# Patient Record
Sex: FEMALE | Race: WHITE | NOT HISPANIC OR LATINO
[De-identification: names, ages, dates, MRNs, and addresses within clinical notes are randomized per-mention and may not be internally consistent; named-entity substitution may affect disease eponyms.]

---

## 2018-02-11 ENCOUNTER — TRANSCRIPTION ENCOUNTER (OUTPATIENT)
Age: 58
End: 2018-02-11

## 2018-11-08 ENCOUNTER — APPOINTMENT (OUTPATIENT)
Dept: OTOLARYNGOLOGY | Facility: CLINIC | Age: 58
End: 2018-11-08
Payer: COMMERCIAL

## 2018-11-08 VITALS
HEART RATE: 87 BPM | SYSTOLIC BLOOD PRESSURE: 120 MMHG | BODY MASS INDEX: 33.84 KG/M2 | WEIGHT: 191 LBS | HEIGHT: 63 IN | DIASTOLIC BLOOD PRESSURE: 80 MMHG | OXYGEN SATURATION: 98 % | TEMPERATURE: 98.5 F

## 2018-11-08 DIAGNOSIS — Z83.3 FAMILY HISTORY OF DIABETES MELLITUS: ICD-10-CM

## 2018-11-08 DIAGNOSIS — Z82.49 FAMILY HISTORY OF ISCHEMIC HEART DISEASE AND OTHER DISEASES OF THE CIRCULATORY SYSTEM: ICD-10-CM

## 2018-11-08 DIAGNOSIS — Z80.6 FAMILY HISTORY OF LEUKEMIA: ICD-10-CM

## 2018-11-08 DIAGNOSIS — J30.9 ALLERGIC RHINITIS, UNSPECIFIED: ICD-10-CM

## 2018-11-08 DIAGNOSIS — Z80.0 FAMILY HISTORY OF MALIGNANT NEOPLASM OF DIGESTIVE ORGANS: ICD-10-CM

## 2018-11-08 PROCEDURE — 99215 OFFICE O/P EST HI 40 MIN: CPT

## 2018-12-10 ENCOUNTER — RESULT REVIEW (OUTPATIENT)
Age: 58
End: 2018-12-10

## 2019-01-09 ENCOUNTER — RESULT REVIEW (OUTPATIENT)
Age: 59
End: 2019-01-09

## 2020-04-23 ENCOUNTER — OUTPATIENT (OUTPATIENT)
Dept: OUTPATIENT SERVICES | Facility: HOSPITAL | Age: 60
LOS: 1 days | End: 2020-04-23

## 2020-06-20 ENCOUNTER — APPOINTMENT (OUTPATIENT)
Dept: DISASTER EMERGENCY | Facility: CLINIC | Age: 60
End: 2020-06-20

## 2020-06-21 LAB — SARS-COV-2 N GENE NPH QL NAA+PROBE: NOT DETECTED

## 2020-09-15 ENCOUNTER — APPOINTMENT (OUTPATIENT)
Dept: INTERNAL MEDICINE | Facility: CLINIC | Age: 60
End: 2020-09-15
Payer: COMMERCIAL

## 2020-09-15 ENCOUNTER — NON-APPOINTMENT (OUTPATIENT)
Age: 60
End: 2020-09-15

## 2020-09-15 VITALS
HEIGHT: 63 IN | DIASTOLIC BLOOD PRESSURE: 76 MMHG | OXYGEN SATURATION: 98 % | HEART RATE: 76 BPM | BODY MASS INDEX: 33.66 KG/M2 | TEMPERATURE: 97.6 F | WEIGHT: 190 LBS | SYSTOLIC BLOOD PRESSURE: 120 MMHG

## 2020-09-15 DIAGNOSIS — R43.0 ANOSMIA: ICD-10-CM

## 2020-09-15 DIAGNOSIS — Z23 ENCOUNTER FOR IMMUNIZATION: ICD-10-CM

## 2020-09-15 DIAGNOSIS — G47.33 OBSTRUCTIVE SLEEP APNEA (ADULT) (PEDIATRIC): ICD-10-CM

## 2020-09-15 DIAGNOSIS — Z01.818 ENCOUNTER FOR OTHER PREPROCEDURAL EXAMINATION: ICD-10-CM

## 2020-09-15 DIAGNOSIS — Z97.3 PRESENCE OF SPECTACLES AND CONTACT LENSES: ICD-10-CM

## 2020-09-15 DIAGNOSIS — G89.29 LOW BACK PAIN: ICD-10-CM

## 2020-09-15 DIAGNOSIS — J34.9 UNSPECIFIED DISORDER OF NOSE AND NASAL SINUSES: ICD-10-CM

## 2020-09-15 DIAGNOSIS — Z12.39 ENCOUNTER FOR OTHER SCREENING FOR MALIGNANT NEOPLASM OF BREAST: ICD-10-CM

## 2020-09-15 DIAGNOSIS — S92.911A UNSPECIFIED FRACTURE OF RIGHT TOE(S), INITIAL ENCOUNTER FOR CLOSED FRACTURE: ICD-10-CM

## 2020-09-15 DIAGNOSIS — Z78.0 ASYMPTOMATIC MENOPAUSAL STATE: ICD-10-CM

## 2020-09-15 DIAGNOSIS — Z13.820 ENCOUNTER FOR SCREENING FOR OSTEOPOROSIS: ICD-10-CM

## 2020-09-15 DIAGNOSIS — R79.89 OTHER SPECIFIED ABNORMAL FINDINGS OF BLOOD CHEMISTRY: ICD-10-CM

## 2020-09-15 DIAGNOSIS — I10 ESSENTIAL (PRIMARY) HYPERTENSION: ICD-10-CM

## 2020-09-15 DIAGNOSIS — Z87.2 PERSONAL HISTORY OF DISEASES OF THE SKIN AND SUBCUTANEOUS TISSUE: ICD-10-CM

## 2020-09-15 DIAGNOSIS — R06.2 WHEEZING: ICD-10-CM

## 2020-09-15 DIAGNOSIS — Z82.69 FAMILY HISTORY OF OTHER DISEASES OF THE MUSCULOSKELETAL SYSTEM AND CONNECTIVE TISSUE: ICD-10-CM

## 2020-09-15 DIAGNOSIS — Z78.9 OTHER SPECIFIED HEALTH STATUS: ICD-10-CM

## 2020-09-15 DIAGNOSIS — H00.019 HORDEOLUM EXTERNUM UNSPECIFIED EYE, UNSPECIFIED EYELID: ICD-10-CM

## 2020-09-15 DIAGNOSIS — H91.90 UNSPECIFIED HEARING LOSS, UNSPECIFIED EAR: ICD-10-CM

## 2020-09-15 DIAGNOSIS — G43.909 MIGRAINE, UNSPECIFIED, NOT INTRACTABLE, W/OUT STATUS MIGRAINOSUS: ICD-10-CM

## 2020-09-15 DIAGNOSIS — K21.9 GASTRO-ESOPHAGEAL REFLUX DISEASE W/OUT ESOPHAGITIS: ICD-10-CM

## 2020-09-15 DIAGNOSIS — J30.2 OTHER SEASONAL ALLERGIC RHINITIS: ICD-10-CM

## 2020-09-15 DIAGNOSIS — Z87.09 PERSONAL HISTORY OF OTHER DISEASES OF THE RESPIRATORY SYSTEM: ICD-10-CM

## 2020-09-15 DIAGNOSIS — R92.2 INCONCLUSIVE MAMMOGRAM: ICD-10-CM

## 2020-09-15 DIAGNOSIS — R01.1 CARDIAC MURMUR, UNSPECIFIED: ICD-10-CM

## 2020-09-15 DIAGNOSIS — Z86.32 PERSONAL HISTORY OF GESTATIONAL DIABETES: ICD-10-CM

## 2020-09-15 DIAGNOSIS — M54.5 LOW BACK PAIN: ICD-10-CM

## 2020-09-15 DIAGNOSIS — R07.89 OTHER CHEST PAIN: ICD-10-CM

## 2020-09-15 PROCEDURE — 93000 ELECTROCARDIOGRAM COMPLETE: CPT

## 2020-09-15 PROCEDURE — 99386 PREV VISIT NEW AGE 40-64: CPT | Mod: 25

## 2020-09-15 PROCEDURE — 90686 IIV4 VACC NO PRSV 0.5 ML IM: CPT

## 2020-09-15 PROCEDURE — 36415 COLL VENOUS BLD VENIPUNCTURE: CPT

## 2020-09-15 PROCEDURE — G0008: CPT

## 2020-09-16 PROBLEM — R07.89 ATYPICAL CHEST PAIN: Status: RESOLVED | Noted: 2020-09-15 | Resolved: 2020-09-16

## 2020-09-16 PROBLEM — G47.33 OBSTRUCTIVE SLEEP APNEA, ADULT: Status: RESOLVED | Noted: 2018-11-08 | Resolved: 2020-09-16

## 2020-09-16 PROBLEM — Z87.09 HISTORY OF BRONCHITIS: Status: RESOLVED | Noted: 2020-09-15 | Resolved: 2020-09-16

## 2020-09-16 PROBLEM — R43.0 ANOSMIA: Status: RESOLVED | Noted: 2018-11-08 | Resolved: 2020-09-16

## 2020-09-16 PROBLEM — Z78.9 ALCOHOL INGESTION: Status: ACTIVE | Noted: 2020-09-16

## 2020-09-16 PROBLEM — Z87.2 HISTORY OF URTICARIA: Status: RESOLVED | Noted: 2020-09-15 | Resolved: 2020-09-16

## 2020-09-16 PROBLEM — H00.019 STYE: Status: RESOLVED | Noted: 2020-09-15 | Resolved: 2020-09-16

## 2020-09-16 PROBLEM — Z78.9 SOCIAL ALCOHOL USE: Status: ACTIVE | Noted: 2018-11-08

## 2020-09-16 PROBLEM — Z78.9 DOES NOT USE ILLICIT DRUGS: Status: ACTIVE | Noted: 2018-11-08

## 2020-09-16 RX ORDER — MINOCYCLINE HYDROCHLORIDE 100 MG/1
100 CAPSULE ORAL
Qty: 10 | Refills: 0 | Status: COMPLETED | COMMUNITY
Start: 2020-07-29

## 2020-09-16 RX ORDER — NAPROXEN SODIUM 220 MG
220 TABLET ORAL
Refills: 0 | Status: DISCONTINUED | COMMUNITY
End: 2020-09-16

## 2020-09-16 RX ORDER — SODIUM SULFATE, POTASSIUM SULFATE, MAGNESIUM SULFATE 17.5; 3.13; 1.6 G/ML; G/ML; G/ML
17.5-3.13-1.6 SOLUTION, CONCENTRATE ORAL
Qty: 354 | Refills: 0 | Status: COMPLETED | COMMUNITY
Start: 2020-05-14

## 2020-09-16 RX ORDER — IBUPROFEN 200 MG/1
200 TABLET, COATED ORAL
Refills: 0 | Status: DISCONTINUED | COMMUNITY
End: 2020-09-16

## 2020-09-16 RX ORDER — FLUTICASONE PROPIONATE 50 UG/1
50 SPRAY, METERED NASAL DAILY
Qty: 1 | Refills: 11 | Status: DISCONTINUED | COMMUNITY
Start: 2018-11-08 | End: 2020-09-16

## 2020-09-16 NOTE — REVIEW OF SYSTEMS
[Negative] : Heme/Lymph [Hearing Loss] : hearing loss [Vision Problems] : vision problems [Dyspnea on Exertion] : dyspnea on exertion [Wheezing] : wheezing [Heartburn] : heartburn [Back Pain] : back pain [Headache] : headache

## 2020-09-17 NOTE — PHYSICAL EXAM
[No Acute Distress] : no acute distress [Well Nourished] : well nourished [Well Developed] : well developed [Well-Appearing] : well-appearing [Normal Sclera/Conjunctiva] : normal sclera/conjunctiva [PERRL] : pupils equal round and reactive to light [EOMI] : extraocular movements intact [Normal Outer Ear/Nose] : the outer ears and nose were normal in appearance [No Lymphadenopathy] : no lymphadenopathy [No JVD] : no jugular venous distention [Supple] : supple [No Respiratory Distress] : no respiratory distress  [Clear to Auscultation] : lungs were clear to auscultation bilaterally [No Accessory Muscle Use] : no accessory muscle use [Normal Rate] : normal rate  [Regular Rhythm] : with a regular rhythm [Normal S1, S2] : normal S1 and S2 [No Carotid Bruits] : no carotid bruits [Pedal Pulses Present] : the pedal pulses are present [No Extremity Clubbing/Cyanosis] : no extremity clubbing/cyanosis [No Edema] : there was no peripheral edema [Non Tender] : non-tender [Soft] : abdomen soft [No Masses] : no abdominal mass palpated [Non-distended] : non-distended [Normal Bowel Sounds] : normal bowel sounds [No HSM] : no HSM [Normal Anterior Cervical Nodes] : no anterior cervical lymphadenopathy [Normal Posterior Cervical Nodes] : no posterior cervical lymphadenopathy [No Spinal Tenderness] : no spinal tenderness [No CVA Tenderness] : no CVA  tenderness [Grossly Normal Strength/Tone] : grossly normal strength/tone [No Rash] : no rash [Coordination Grossly Intact] : coordination grossly intact [No Focal Deficits] : no focal deficits [Normal Affect] : the affect was normal [Normal Gait] : normal gait [Normal Voice/Communication] : normal voice/communication [Normal TMs] : both tympanic membranes were normal [Declined Breast Exam] : declined breast exam  [Declined Rectal Exam] : declined rectal exam [Normal Supraclavicular Nodes] : no supraclavicular lymphadenopathy [Normal Axillary Nodes] : no axillary lymphadenopathy [Deep Tendon Reflexes (DTR)] : deep tendon reflexes were 2+ and symmetric [Speech Grossly Normal] : speech grossly normal [Alert and Oriented x3] : oriented to person, place, and time [de-identified] : No sinus tenderness; wearing full facemask [de-identified] : No stridor or thyromegaly [de-identified] : R=16; good air entry with no wheezing [de-identified] : 2/6 systolic murmur [de-identified] : Normal radial pulses; no cords [de-identified] : As per GYN [de-identified] : Very tanned

## 2020-09-17 NOTE — HEALTH RISK ASSESSMENT
[Good] : ~his/her~ current health as good [Fair] :  ~his/her~ mood as fair [Intercurrent Urgi Care visits] : went to urgent care [Yes] : Yes [1 or 2 (0 pts)] : 1 or 2 (0 points) [Monthly or less (1 pt)] : Monthly or less (1 point) [Never (0 pts)] : Never (0 points) [No] : In the past 12 months have you used drugs other than those required for medical reasons? No [No falls in past year] : Patient reported no falls in the past year [Patient declined bone density test] : Patient declined bone density test [HIV Test offered] : HIV Test offered [Hepatitis C test offered] : Hepatitis C test offered [# of Members in Household ___] :  household currently consist of [unfilled] member(s) [Behavioral] : behavioral [With Family] : lives with family [Employed] : employed [Graduate School] : graduate school [] :  [# Of Children ___] : has [unfilled] children [Sexually Active] : sexually active [Feels Safe at Home] : Feels safe at home [Fully functional (bathing, dressing, toileting, transferring, walking, feeding)] : Fully functional (bathing, dressing, toileting, transferring, walking, feeding) [Reports changes in hearing] : Reports changes in hearing [Fully functional (using the telephone, shopping, preparing meals, housekeeping, doing laundry, using] : Fully functional and needs no help or supervision to perform IADLs (using the telephone, shopping, preparing meals, housekeeping, doing laundry, using transportation, managing medications and managing finances) [Reports changes in vision] : Reports changes in vision [Guns at Home] : guns at home [Smoke Detector] : smoke detector [Carbon Monoxide Detector] : carbon monoxide detector [Seat Belt] :  uses seat belt [Sunscreen] : uses sunscreen [With Patient/Caregiver] : With Patient/Caregiver [Name: ___] : Health Care Proxy's Name: [unfilled]  [Designated Healthcare Proxy] : Designated healthcare proxy [Relationship: ___] : Relationship: [unfilled] [Patient declined PAP Smear] : Patient declined PAP Smear [Patient declined mammogram] : Patient declined mammogram [0] : 2) Feeling down, depressed, or hopeless: Not at all (0) [FreeTextEntry1] : HTN, SOB, left eye stye/broken blood vessels [] : No [de-identified] : cardiology, dentist, ophtho, derm, ENT [Audit-CScore] : 1 [de-identified] : babita [de-identified] : low carb/ low salt [[id number]] : 0 [CXQ2Xtslv] : 0 [Change in mental status noted] : No change in mental status noted [Reports changes in dental health] : Reports no changes in dental health [Travel to Developing Areas] : does not  travel to developing areas [TB Exposure] : is not being exposed to tuberculosis [Caregiver Concerns] : does not have caregiver concerns [MammogramDate] : 2018 [PapSmearDate] : 2018 [ColonoscopyDate] : 06/20 [FreeTextEntry2] :  [AdvancecareDate] : 09/20

## 2020-09-17 NOTE — ASSESSMENT
[FreeTextEntry1] : See health maintenance assessment and plan outlined below.  In addition:\par Full labs and urine sent today\par To do bone density 2020 = RX given\par Can see ortho- spine for LBP = referrals given\par Had colonoscopy 2020 - will obtain report\par Must see GYN 2020\par To do mammograms 2020 = RX given\par See scanned EKG report = reviewed with patient\par To see cardiology in f/u \par Needs echo and needs 24 hour home BP monitoring\par To do CXR 2020 = RX given\par To set up full PFT's\par To do thyroid US\par Endocrine f/u = referrals given\par To see ENT for hearing test and sinus issues = referrals given\par Allergy consult with Dr. Mcgarry\par Referred to sleep medicine for FLORIDALMA f/u\par Can see Neuro for migraines if needed\par To f/u with derm, ophtho, dentist\par RTC for annual physical\par RTC after PFT's and as needed\par To call for any medical issues\par

## 2020-09-21 LAB
25(OH)D3 SERPL-MCNC: 29.5 NG/ML
ABO + RH PNL BLD: NORMAL
ALBUMIN SERPL ELPH-MCNC: 4.3 G/DL
ALP BLD-CCNC: 103 U/L
ALT SERPL-CCNC: 25 U/L
ANION GAP SERPL CALC-SCNC: 11 MMOL/L
APPEARANCE: CLEAR
AST SERPL-CCNC: 17 U/L
BACTERIA: ABNORMAL
BASOPHILS # BLD AUTO: 0.08 K/UL
BASOPHILS NFR BLD AUTO: 1.1 %
BILIRUB SERPL-MCNC: 0.3 MG/DL
BILIRUBIN URINE: NEGATIVE
BLOOD URINE: NEGATIVE
BUN SERPL-MCNC: 21 MG/DL
CALCIUM SERPL-MCNC: 9.3 MG/DL
CHLORIDE SERPL-SCNC: 105 MMOL/L
CHOLEST SERPL-MCNC: 180 MG/DL
CHOLEST/HDLC SERPL: 3.6 RATIO
CO2 SERPL-SCNC: 24 MMOL/L
COLOR: NORMAL
CREAT SERPL-MCNC: 0.7 MG/DL
EOSINOPHIL # BLD AUTO: 0.1 K/UL
EOSINOPHIL NFR BLD AUTO: 1.4 %
ESTIMATED AVERAGE GLUCOSE: 120 MG/DL
FOLATE SERPL-MCNC: 11 NG/ML
GLUCOSE QUALITATIVE U: NEGATIVE
GLUCOSE SERPL-MCNC: 114 MG/DL
HBA1C MFR BLD HPLC: 5.8 %
HCT VFR BLD CALC: 42.6 %
HCV AB SER QL: NONREACTIVE
HCV S/CO RATIO: 0.11 S/CO
HDLC SERPL-MCNC: 50 MG/DL
HGB BLD-MCNC: 13.8 G/DL
HIV1+2 AB SPEC QL IA.RAPID: NONREACTIVE
HYALINE CASTS: 2 /LPF
IMM GRANULOCYTES NFR BLD AUTO: 0.1 %
IRON SERPL-MCNC: 57 UG/DL
KETONES URINE: NEGATIVE
LDLC SERPL CALC-MCNC: 117 MG/DL
LEUKOCYTE ESTERASE URINE: NEGATIVE
LYMPHOCYTES # BLD AUTO: 1.81 K/UL
LYMPHOCYTES NFR BLD AUTO: 24.6 %
MAN DIFF?: NORMAL
MCHC RBC-ENTMCNC: 29.7 PG
MCHC RBC-ENTMCNC: 32.4 GM/DL
MCV RBC AUTO: 91.8 FL
MICROSCOPIC-UA: NORMAL
MONOCYTES # BLD AUTO: 0.54 K/UL
MONOCYTES NFR BLD AUTO: 7.3 %
NEUTROPHILS # BLD AUTO: 4.81 K/UL
NEUTROPHILS NFR BLD AUTO: 65.5 %
NITRITE URINE: NEGATIVE
PH URINE: 6
PLATELET # BLD AUTO: 293 K/UL
POTASSIUM SERPL-SCNC: 5.2 MMOL/L
PROT SERPL-MCNC: 6.7 G/DL
PROTEIN URINE: NORMAL
RBC # BLD: 4.64 M/UL
RBC # FLD: 13 %
RED BLOOD CELLS URINE: 1 /HPF
SODIUM SERPL-SCNC: 140 MMOL/L
SPECIFIC GRAVITY URINE: 1.03
SQUAMOUS EPITHELIAL CELLS: 8 /HPF
TRIGL SERPL-MCNC: 66 MG/DL
TSH SERPL-ACNC: 1.25 UIU/ML
UROBILINOGEN URINE: NORMAL
VIT B12 SERPL-MCNC: 1071 PG/ML
WBC # FLD AUTO: 7.35 K/UL
WHITE BLOOD CELLS URINE: 6 /HPF

## 2020-11-02 ENCOUNTER — NON-APPOINTMENT (OUTPATIENT)
Age: 60
End: 2020-11-02

## 2020-11-10 ENCOUNTER — NON-APPOINTMENT (OUTPATIENT)
Age: 60
End: 2020-11-10

## 2020-11-24 ENCOUNTER — APPOINTMENT (OUTPATIENT)
Dept: ENDOCRINOLOGY | Facility: CLINIC | Age: 60
End: 2020-11-24
Payer: COMMERCIAL

## 2020-12-22 ENCOUNTER — APPOINTMENT (OUTPATIENT)
Dept: ENDOCRINOLOGY | Facility: CLINIC | Age: 60
End: 2020-12-22
Payer: COMMERCIAL

## 2020-12-22 VITALS
SYSTOLIC BLOOD PRESSURE: 143 MMHG | HEART RATE: 81 BPM | RESPIRATION RATE: 16 BRPM | BODY MASS INDEX: 34.2 KG/M2 | OXYGEN SATURATION: 96 % | TEMPERATURE: 98.6 F | DIASTOLIC BLOOD PRESSURE: 77 MMHG | WEIGHT: 193.03 LBS | HEIGHT: 63 IN

## 2020-12-22 PROCEDURE — 99244 OFF/OP CNSLTJ NEW/EST MOD 40: CPT

## 2020-12-22 PROCEDURE — 99072 ADDL SUPL MATRL&STAF TM PHE: CPT

## 2020-12-22 NOTE — CONSULT LETTER
[Dear  ___] : Dear  [unfilled], [Consult Letter:] : I had the pleasure of evaluating your patient, [unfilled]. [( Thank you for referring [unfilled] for consultation for _____ )] : Thank you for referring [unfilled] for consultation for [unfilled] [Please see my note below.] : Please see my note below. [Consult Closing:] : Thank you very much for allowing me to participate in the care of this patient.  If you have any questions, please do not hesitate to contact me. [Sincerely,] : Sincerely, [FreeTextEntry2] : Lakshmi Johns MD\par [address]\[place] [address]\par  [FreeTextEntry3] : Woody Baltazar DO\par  of Medicine\par [facility]\par

## 2020-12-22 NOTE — PHYSICAL EXAM
[Alert] : alert [No Acute Distress] : no acute distress [EOMI] : extra ocular movement intact [No LAD] : no lymphadenopathy [No Respiratory Distress] : no respiratory distress [Clear to Auscultation] : lungs were clear to auscultation bilaterally [Normal S1, S2] : normal S1 and S2 [Regular Rhythm] : with a regular rhythm [No Edema] : no peripheral edema [Normal Bowel Sounds] : normal bowel sounds [Not Tender] : non-tender [Not Distended] : not distended [Soft] : abdomen soft [Normal Anterior Cervical Nodes] : no anterior cervical lymphadenopathy [No Clubbing, Cyanosis] : no clubbing  or cyanosis of the fingernails [No Rash] : no rash [Normal Reflexes] : deep tendon reflexes were 2+ and symmetric [Normal Affect] : the affect was normal [Normal Mood] : the mood was normal [de-identified] : RAVEN

## 2020-12-22 NOTE — ASSESSMENT
[FreeTextEntry1] : 60 y.o. female with h/o multinodular goiter, prediabetes and obesity.\par \par 1. MNG/multiple thyroid nodules- Discussed pathophysiology. Patient is clinically and biochemically euthyroid. There is no need for thyroid hormone replacement at this time. Given the large size of the left dominant thyroid nodule, would consider surgery. Patient is not interested in surgery at this time. For now, will check CT scan of the neck to evaluate for tracheal compression. Also will refer for ultrasound guided FNA of b/l dominant nodules since we will be monitoring for now. \par \par 2. Prediabetes and Obesity- Hba1c is 5.8%. Discussed increased risk of developing Type 2 DM. Encouraged a carbohydrate consistent diet with portion control and exercise. Dietary literature was provided to the patient. \par \par Follow up pending results

## 2020-12-22 NOTE — HISTORY OF PRESENT ILLNESS
[FreeTextEntry1] : 60 y.o. female with h/o thyroid nodules diagnosed in 2004 presents for management. Was following with Dr. Jackman (ENT) in 2018 and reports had FNA in ?2015 which was benign. Most recent thyroid ultrasound on October 30, 2020 showed enlarged gland with left lobe larger than right. There are multiple b/l nodules with largest on the right measuring 2.0 which is hypoechoic nodule with cystic component. The largest on the left is 5.1 cm isoechoic solid nodule with small cystic components. Nodules appear relatively stable compared to 2018. No dysphagia and no dysphonia. No neck pain. No head or neck RT exposure. No family history of thyroid disease. In regards to symptoms, patient reports fatigue, body pains, and weight gain. \par \par \par Does report SOB with exertion. Seeing cardiology at [place]. Had normal stress echo. \par \par Also h/o prediabetes. No polyuria and no polydipsia. Now wearing glasses; otherwise vision is stable. Reports gradual weight gain over the years. Reports weight gain of 12 pounds during pandemic. No exercise.

## 2020-12-22 NOTE — REVIEW OF SYSTEMS
[Fatigue] : fatigue [Recent Weight Gain (___ Lbs)] : recent weight gain: [unfilled] lbs [SOB on Exertion] : shortness of breath on exertion [Back Pain] : back pain [Anxiety] : anxiety [Stress] : stress [Hot Flashes] : hot flashes [Negative] : Gastrointestinal [Polyuria] : no polyuria [Dry Skin] : no dry skin [Hair Loss] : no hair loss [Polydipsia] : no polydipsia [Swelling] : no swelling [FreeTextEntry9] : leg pain

## 2020-12-22 NOTE — REASON FOR VISIT
[Consultation] : a consultation visit [Thyroid nodule/ MNG] : thyroid nodule/ MNG [FreeTextEntry2] : Lakshmi Johns MD

## 2021-01-19 ENCOUNTER — APPOINTMENT (OUTPATIENT)
Dept: INTERNAL MEDICINE | Facility: CLINIC | Age: 61
End: 2021-01-19

## 2021-01-19 ENCOUNTER — TRANSCRIPTION ENCOUNTER (OUTPATIENT)
Age: 61
End: 2021-01-19

## 2021-01-19 ENCOUNTER — APPOINTMENT (OUTPATIENT)
Dept: INTERNAL MEDICINE | Facility: CLINIC | Age: 61
End: 2021-01-19
Payer: COMMERCIAL

## 2021-01-19 ENCOUNTER — RESULT REVIEW (OUTPATIENT)
Age: 61
End: 2021-01-19

## 2021-01-19 PROCEDURE — 99442: CPT

## 2021-01-20 ENCOUNTER — APPOINTMENT (OUTPATIENT)
Dept: ULTRASOUND IMAGING | Facility: IMAGING CENTER | Age: 61
End: 2021-01-20

## 2021-01-20 ENCOUNTER — OUTPATIENT (OUTPATIENT)
Dept: OUTPATIENT SERVICES | Facility: HOSPITAL | Age: 61
LOS: 1 days | End: 2021-01-20
Payer: COMMERCIAL

## 2021-01-20 ENCOUNTER — APPOINTMENT (OUTPATIENT)
Dept: CT IMAGING | Facility: IMAGING CENTER | Age: 61
End: 2021-01-20

## 2021-01-20 ENCOUNTER — RESULT REVIEW (OUTPATIENT)
Age: 61
End: 2021-01-20

## 2021-01-20 DIAGNOSIS — E04.2 NONTOXIC MULTINODULAR GOITER: ICD-10-CM

## 2021-01-20 PROCEDURE — 88172 CYTP DX EVAL FNA 1ST EA SITE: CPT

## 2021-01-20 PROCEDURE — 10005 FNA BX W/US GDN 1ST LES: CPT

## 2021-01-20 PROCEDURE — 10006 FNA BX W/US GDN EA ADDL: CPT

## 2021-01-20 PROCEDURE — 70490 CT SOFT TISSUE NECK W/O DYE: CPT | Mod: 26

## 2021-01-20 PROCEDURE — 88173 CYTOPATH EVAL FNA REPORT: CPT

## 2021-01-20 PROCEDURE — 70490 CT SOFT TISSUE NECK W/O DYE: CPT

## 2021-01-20 PROCEDURE — 88173 CYTOPATH EVAL FNA REPORT: CPT | Mod: 26

## 2021-01-21 LAB — NON-GYNECOLOGICAL CYTOLOGY STUDY: SIGNIFICANT CHANGE UP

## 2021-01-26 ENCOUNTER — TRANSCRIPTION ENCOUNTER (OUTPATIENT)
Age: 61
End: 2021-01-26

## 2021-02-24 ENCOUNTER — APPOINTMENT (OUTPATIENT)
Dept: INTERNAL MEDICINE | Facility: CLINIC | Age: 61
End: 2021-02-24
Payer: COMMERCIAL

## 2021-02-24 VITALS
OXYGEN SATURATION: 97 % | TEMPERATURE: 97.2 F | HEART RATE: 91 BPM | WEIGHT: 192 LBS | DIASTOLIC BLOOD PRESSURE: 80 MMHG | HEIGHT: 63 IN | SYSTOLIC BLOOD PRESSURE: 144 MMHG | BODY MASS INDEX: 34.02 KG/M2

## 2021-02-24 DIAGNOSIS — U07.1 COVID-19: ICD-10-CM

## 2021-02-24 DIAGNOSIS — R22.2 LOCALIZED SWELLING, MASS AND LUMP, TRUNK: ICD-10-CM

## 2021-02-24 PROCEDURE — 36415 COLL VENOUS BLD VENIPUNCTURE: CPT

## 2021-02-24 PROCEDURE — 99072 ADDL SUPL MATRL&STAF TM PHE: CPT

## 2021-02-24 PROCEDURE — 99214 OFFICE O/P EST MOD 30 MIN: CPT | Mod: 25

## 2021-02-25 PROBLEM — U07.1 COVID-19: Status: ACTIVE | Noted: 2021-01-20

## 2021-02-25 PROBLEM — R22.2 PALPABLE MASS OF LOWER BACK: Status: ACTIVE | Noted: 2021-02-25

## 2021-02-25 NOTE — REVIEW OF SYSTEMS
[Vision Problems] : vision problems [Hearing Loss] : hearing loss [Cough] : cough [Heartburn] : heartburn [Back Pain] : back pain [Headache] : headache [Negative] : Heme/Lymph

## 2021-02-25 NOTE — HEALTH RISK ASSESSMENT
[] : No [Yes] : Yes [No] : In the past 12 months have you used drugs other than those required for medical reasons? No [No falls in past year] : Patient reported no falls in the past year [0] : 2) Feeling down, depressed, or hopeless: Not at all (0) [de-identified] : ENT, endocrine [de-identified] : None [de-identified] : Regular [[id number]] : 0
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
## 2021-02-25 NOTE — PHYSICAL EXAM
[No Acute Distress] : no acute distress [Well Nourished] : well nourished [Well Developed] : well developed [Well-Appearing] : well-appearing [Normal Voice/Communication] : normal voice/communication [Normal Sclera/Conjunctiva] : normal sclera/conjunctiva [PERRL] : pupils equal round and reactive to light [EOMI] : extraocular movements intact [Normal Outer Ear/Nose] : the outer ears and nose were normal in appearance [Normal TMs] : both tympanic membranes were normal [No JVD] : no jugular venous distention [Supple] : supple [No Respiratory Distress] : no respiratory distress  [No Accessory Muscle Use] : no accessory muscle use [Clear to Auscultation] : lungs were clear to auscultation bilaterally [Normal Rate] : normal rate  [Regular Rhythm] : with a regular rhythm [Normal S1, S2] : normal S1 and S2 [No Carotid Bruits] : no carotid bruits [Pedal Pulses Present] : the pedal pulses are present [No Edema] : there was no peripheral edema [No Extremity Clubbing/Cyanosis] : no extremity clubbing/cyanosis [Declined Breast Exam] : declined breast exam  [Soft] : abdomen soft [Non-distended] : non-distended [Normal Bowel Sounds] : normal bowel sounds [Declined Rectal Exam] : declined rectal exam [No CVA Tenderness] : no CVA  tenderness [No Spinal Tenderness] : no spinal tenderness [No Rash] : no rash [No Focal Deficits] : no focal deficits [Normal Gait] : normal gait [Speech Grossly Normal] : speech grossly normal [Normal Affect] : the affect was normal [Alert and Oriented x3] : oriented to person, place, and time [de-identified] : No sinus tenderness; wearing full facemask [de-identified] : No stridor  [de-identified] : R=16; good air entry with no wheezing [de-identified] : 2/6 systolic murmur [de-identified] : Normal radial pulses; no cords [de-identified] : As per GYN [de-identified] : Has subcutaneous nodule right lower back?  Lipoma

## 2021-02-26 LAB
ALBUMIN SERPL ELPH-MCNC: 4.4 G/DL
ALP BLD-CCNC: 117 U/L
ALT SERPL-CCNC: 25 U/L
ANION GAP SERPL CALC-SCNC: 12 MMOL/L
AST SERPL-CCNC: 20 U/L
BASOPHILS # BLD AUTO: 0.08 K/UL
BASOPHILS NFR BLD AUTO: 0.7 %
BILIRUB SERPL-MCNC: 0.2 MG/DL
BUN SERPL-MCNC: 25 MG/DL
CALCIUM SERPL-MCNC: 9.3 MG/DL
CHLORIDE SERPL-SCNC: 103 MMOL/L
CO2 SERPL-SCNC: 24 MMOL/L
COHGB MFR BLDV: 1.3 %
CREAT SERPL-MCNC: 0.88 MG/DL
EOSINOPHIL # BLD AUTO: 0.14 K/UL
EOSINOPHIL NFR BLD AUTO: 1.2 %
GAS + CO PNL BLD: NORMAL
GLUCOSE SERPL-MCNC: 88 MG/DL
HCT VFR BLD CALC: 44 %
HGB BLD-MCNC: 14.2 G/DL
HGB BLD-MCNC: 14.6 G/DL
IMM GRANULOCYTES NFR BLD AUTO: 0.4 %
LYMPHOCYTES # BLD AUTO: 2.19 K/UL
LYMPHOCYTES NFR BLD AUTO: 19.4 %
MAN DIFF?: NORMAL
MCHC RBC-ENTMCNC: 30.1 PG
MCHC RBC-ENTMCNC: 32.3 GM/DL
MCV RBC AUTO: 93.4 FL
MONOCYTES # BLD AUTO: 0.74 K/UL
MONOCYTES NFR BLD AUTO: 6.5 %
NEUTROPHILS # BLD AUTO: 8.11 K/UL
NEUTROPHILS NFR BLD AUTO: 71.8 %
O2HB: 60 %
PLATELET # BLD AUTO: 290 K/UL
POTASSIUM SERPL-SCNC: 4.1 MMOL/L
PROT SERPL-MCNC: 7 G/DL
RBC # BLD: 4.71 M/UL
RBC # FLD: 12.9 %
SODIUM SERPL-SCNC: 140 MMOL/L
WBC # FLD AUTO: 11.3 K/UL

## 2021-03-09 ENCOUNTER — APPOINTMENT (OUTPATIENT)
Dept: INTERNAL MEDICINE | Facility: CLINIC | Age: 61
End: 2021-03-09
Payer: COMMERCIAL

## 2021-03-09 DIAGNOSIS — Z63.79 OTHER STRESSFUL LIFE EVENTS AFFECTING FAMILY AND HOUSEHOLD: ICD-10-CM

## 2021-03-09 PROCEDURE — 99214 OFFICE O/P EST MOD 30 MIN: CPT | Mod: 95

## 2021-03-11 ENCOUNTER — RESULT REVIEW (OUTPATIENT)
Age: 61
End: 2021-03-11

## 2021-03-11 ENCOUNTER — APPOINTMENT (OUTPATIENT)
Dept: CT IMAGING | Facility: CLINIC | Age: 61
End: 2021-03-11
Payer: COMMERCIAL

## 2021-03-11 PROBLEM — Z63.79 STRESSFUL LIFE EVENTS AFFECTING FAMILY AND HOUSEHOLD: Status: ACTIVE | Noted: 2021-02-24

## 2021-03-11 PROCEDURE — 71250 CT THORAX DX C-: CPT

## 2021-03-11 RX ORDER — VENLAFAXINE HYDROCHLORIDE 37.5 MG/1
37.5 CAPSULE, EXTENDED RELEASE ORAL
Qty: 90 | Refills: 0 | Status: DISCONTINUED | COMMUNITY
Start: 2020-09-30 | End: 2021-03-11
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
## 2021-03-11 NOTE — HEALTH RISK ASSESSMENT
[] : No [No] : No [No falls in past year] : Patient reported no falls in the past year [(PHQ-2) Unable to screen] : PHQ-2: unable to screen [de-identified] : None [de-identified] : Regular

## 2021-03-11 NOTE — REVIEW OF SYSTEMS
[Redness] : redness [Vision Problems] : vision problems [Hearing Loss] : hearing loss [Hoarseness] : hoarseness [Cough] : cough [Diarrhea] : diarrhea [Heartburn] : heartburn [Back Pain] : back pain [Headache] : headache [Dizziness] : dizziness [Insomnia] : insomnia [Anxiety] : anxiety [Depression] : depression [Negative] : Heme/Lymph

## 2021-03-11 NOTE — PHYSICAL EXAM
[No Acute Distress] : no acute distress [Well Nourished] : well nourished [Well Developed] : well developed [Well-Appearing] : well-appearing [Normal Voice/Communication] : normal voice/communication [No Respiratory Distress] : no respiratory distress  [No Accessory Muscle Use] : no accessory muscle use [Speech Grossly Normal] : speech grossly normal [Normal Affect] : the affect was normal [Alert and Oriented x3] : oriented to person, place, and time [de-identified] : No audible stridor or wheezing
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
## 2021-03-30 ENCOUNTER — OUTPATIENT (OUTPATIENT)
Dept: OUTPATIENT SERVICES | Facility: HOSPITAL | Age: 61
LOS: 1 days | End: 2021-03-30

## 2021-04-01 ENCOUNTER — APPOINTMENT (OUTPATIENT)
Dept: INTERNAL MEDICINE | Facility: CLINIC | Age: 61
End: 2021-04-01
Payer: COMMERCIAL

## 2021-04-01 ENCOUNTER — RX CHANGE (OUTPATIENT)
Age: 61
End: 2021-04-01

## 2021-04-01 VITALS
DIASTOLIC BLOOD PRESSURE: 78 MMHG | WEIGHT: 195 LBS | OXYGEN SATURATION: 97 % | TEMPERATURE: 97.7 F | HEART RATE: 72 BPM | HEIGHT: 63 IN | BODY MASS INDEX: 34.55 KG/M2 | SYSTOLIC BLOOD PRESSURE: 136 MMHG

## 2021-04-01 DIAGNOSIS — R06.00 DYSPNEA, UNSPECIFIED: ICD-10-CM

## 2021-04-01 DIAGNOSIS — F43.9 REACTION TO SEVERE STRESS, UNSPECIFIED: ICD-10-CM

## 2021-04-01 DIAGNOSIS — T59.811A TOXIC EFFECT OF SMOKE, ACCIDENTAL (UNINTENTIONAL), INITIAL ENCOUNTER: ICD-10-CM

## 2021-04-01 PROCEDURE — 94726 PLETHYSMOGRAPHY LUNG VOLUMES: CPT

## 2021-04-01 PROCEDURE — 99214 OFFICE O/P EST MOD 30 MIN: CPT | Mod: 25

## 2021-04-01 PROCEDURE — 99072 ADDL SUPL MATRL&STAF TM PHE: CPT

## 2021-04-01 PROCEDURE — 94729 DIFFUSING CAPACITY: CPT

## 2021-04-01 PROCEDURE — 94010 BREATHING CAPACITY TEST: CPT

## 2021-04-01 RX ORDER — ALBUTEROL SULFATE 90 UG/1
108 (90 BASE) INHALANT RESPIRATORY (INHALATION)
Qty: 3 | Refills: 1 | Status: ACTIVE | COMMUNITY
Start: 2021-04-01 | End: 1900-01-01

## 2021-04-01 RX ORDER — VENLAFAXINE 75 MG/1
75 TABLET ORAL DAILY
Qty: 90 | Refills: 1 | Status: DISCONTINUED | COMMUNITY
Start: 2021-02-24 | End: 2021-04-01
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
## 2021-04-01 NOTE — HEALTH RISK ASSESSMENT
[] : No [Yes] : Yes [No] : In the past 12 months have you used drugs other than those required for medical reasons? No [No falls in past year] : Patient reported no falls in the past year [0] : 2) Feeling down, depressed, or hopeless: Not at all (0) [de-identified] : None [[id number]] : 0 [de-identified] : Regular

## 2021-04-01 NOTE — CURRENT MEDS
[Takes medication as prescribed] : takes [Yes] : Reviewed medication list for presence of high-risk medications.

## 2021-04-01 NOTE — REVIEW OF SYSTEMS
[Vision Problems] : vision problems [Hearing Loss] : hearing loss [Dyspnea on Exertion] : dyspnea on exertion [Heartburn] : heartburn [Back Pain] : back pain [Headache] : headache [Insomnia] : insomnia [Anxiety] : anxiety [Negative] : Heme/Lymph

## 2021-04-01 NOTE — PHYSICAL EXAM
[No Acute Distress] : no acute distress [Well Nourished] : well nourished [Well Developed] : well developed [Well-Appearing] : well-appearing [Normal Voice/Communication] : normal voice/communication [Normal Sclera/Conjunctiva] : normal sclera/conjunctiva [PERRL] : pupils equal round and reactive to light [EOMI] : extraocular movements intact [Normal Outer Ear/Nose] : the outer ears and nose were normal in appearance [No JVD] : no jugular venous distention [No Respiratory Distress] : no respiratory distress  [Supple] : supple [No Accessory Muscle Use] : no accessory muscle use [Clear to Auscultation] : lungs were clear to auscultation bilaterally [Normal Rate] : normal rate  [Regular Rhythm] : with a regular rhythm [Normal S1, S2] : normal S1 and S2 [No Carotid Bruits] : no carotid bruits [Pedal Pulses Present] : the pedal pulses are present [No Edema] : there was no peripheral edema [No Extremity Clubbing/Cyanosis] : no extremity clubbing/cyanosis [Declined Breast Exam] : declined breast exam  [Soft] : abdomen soft [Non-distended] : non-distended [Normal Bowel Sounds] : normal bowel sounds [Declined Rectal Exam] : declined rectal exam [No CVA Tenderness] : no CVA  tenderness [No Spinal Tenderness] : no spinal tenderness [No Rash] : no rash [No Focal Deficits] : no focal deficits [Normal Gait] : normal gait [Speech Grossly Normal] : speech grossly normal [Normal Affect] : the affect was normal [Alert and Oriented x3] : oriented to person, place, and time [de-identified] : No sinus tenderness; wearing full facemask [de-identified] : No stridor  [de-identified] : R=16; good air entry with no wheezing [de-identified] : 2/6 systolic murmur [de-identified] : Normal radial pulses; no cords [de-identified] : As per GYN [de-identified] : Has subcutaneous nodule right lower back?  Lipoma
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
## 2021-10-06 PROBLEM — I10 ESSENTIAL HYPERTENSION: Status: ACTIVE | Noted: 2020-09-15

## 2022-01-27 ENCOUNTER — RX RENEWAL (OUTPATIENT)
Age: 62
End: 2022-01-27

## 2022-02-02 ENCOUNTER — APPOINTMENT (OUTPATIENT)
Dept: ENDOCRINOLOGY | Facility: CLINIC | Age: 62
End: 2022-02-02
Payer: COMMERCIAL

## 2022-02-02 VITALS
SYSTOLIC BLOOD PRESSURE: 130 MMHG | DIASTOLIC BLOOD PRESSURE: 80 MMHG | BODY MASS INDEX: 35.44 KG/M2 | WEIGHT: 200 LBS | TEMPERATURE: 97.3 F | HEART RATE: 88 BPM | HEIGHT: 63 IN | OXYGEN SATURATION: 97 %

## 2022-02-02 PROCEDURE — 99214 OFFICE O/P EST MOD 30 MIN: CPT

## 2022-02-03 NOTE — END OF VISIT
[Time Spent: ___ minutes] : I have spent [unfilled] minutes of time on the encounter. Patient expressed no known problems or needs

## 2022-02-03 NOTE — DATA REVIEWED
[FreeTextEntry1] : Labs\par 12/9/21\par glucose 87\par BUN/cr 20/.67\par calcium 9.4\par TSH 1.49\par TPO ab 12.4 Tg ab <20\par

## 2022-02-03 NOTE — HISTORY OF PRESENT ILLNESS
[FreeTextEntry1] : 61 y.o. female with h/o thyroid nodules diagnosed in 2004 presents for follow up visit. Was following with Dr. Jackman (ENT) in 2018 and reports had FNA in ?2015 which was benign. Most recent thyroid ultrasound on October 30, 2020 showed enlarged gland with left lobe larger than right. There are multiple b/l nodules with largest on the right measuring 2.0 which is hypoechoic nodule with cystic component. The largest on the left is 5.1 cm isoechoic solid nodule with small cystic components. Nodules appear relatively stable compared to 2018. No dysphagia and no dysphonia. Reports neck tightness now. No head or neck RT exposure. No family history of thyroid disease. In regards to symptoms, patient reports fatigue, body pains, and weight gain. Reports h/o hives and did see allergy. \par \par On 1/20/21 had FNA of right lower pole 2.6 cm nodule which was consistent with nodular goiter and FNA of left midpole 4.9 cm nodule which was consistent with nodular goiter. \par \par CT scan of neck in January 2021 showed enlargement of the left thyroid lobe with associated nodules. There was mild rightward effect on the trachea without significant narrowing. \par \par She reports episode of left sided chest pressure and not sure if related to stress. Her  at [facility] s/p AVR. \par \par Does report SOB with exertion. Seeing cardiology at [facility]. Had normal stress echo. Has cardiology appointment tomorrow. \par \par Also h/o prediabetes. No polyuria and no polydipsia. Now wearing glasses; otherwise vision is stable. Reports gradual weight gain over the years. Reports weight gain of 12 pounds during pandemic. No exercise.

## 2022-02-03 NOTE — PHYSICAL EXAM
[Alert] : alert [No Acute Distress] : no acute distress [EOMI] : extra ocular movement intact [No LAD] : no lymphadenopathy [No Respiratory Distress] : no respiratory distress [Clear to Auscultation] : lungs were clear to auscultation bilaterally [Normal S1, S2] : normal S1 and S2 [Regular Rhythm] : with a regular rhythm [No Edema] : no peripheral edema [Normal Bowel Sounds] : normal bowel sounds [Not Tender] : non-tender [Not Distended] : not distended [Soft] : abdomen soft [Normal Anterior Cervical Nodes] : no anterior cervical lymphadenopathy [No Clubbing, Cyanosis] : no clubbing  or cyanosis of the fingernails [No Rash] : no rash [Normal Reflexes] : deep tendon reflexes were 2+ and symmetric [Normal Affect] : the affect was normal [Normal Mood] : the mood was normal [de-identified] : RAVEN

## 2022-02-03 NOTE — REVIEW OF SYSTEMS
[Fatigue] : fatigue [Recent Weight Gain (___ Lbs)] : recent weight gain: [unfilled] lbs [SOB on Exertion] : shortness of breath on exertion [Joint Pain] : joint pain [Back Pain] : back pain [Anxiety] : anxiety [Stress] : stress [Hot Flashes] : hot flashes [Negative] : Gastrointestinal [Dysphagia] : no dysphagia [Dysphonia] : no dysphonia [Chest Pain] : chest pain [Polyuria] : no polyuria [Dry Skin] : no dry skin [Hair Loss] : no hair loss [Polydipsia] : no polydipsia [Swelling] : no swelling [FreeTextEntry4] : neck tightness

## 2022-02-03 NOTE — ASSESSMENT
[FreeTextEntry1] : 61 y.o. female with h/o multinodular goiter, prediabetes and obesity.\par \par 1. MNG- Suspect neck symptoms are related to stress. Discussed pathophysiology. Patient is clinically and biochemically euthyroid. There is no need for thyroid hormone replacement at this time. Given the large size of the left dominant thyroid nodule, would consider surgery. Patient is not interested in surgery at this time. For now, will check thyroid ultrasound to evaluate nodules.\par \par 2. Prediabetes and Obesity- Hba1c is 5.8% in 2020. Discussed increased risk of developing Type 2 DM. Encouraged a carbohydrate consistent diet with portion control and exercise.  \par \par Follow up in 1 year if stable\par Follow up with cardiology

## 2022-02-07 ENCOUNTER — APPOINTMENT (OUTPATIENT)
Dept: SURGICAL ONCOLOGY | Facility: CLINIC | Age: 62
End: 2022-02-07
Payer: COMMERCIAL

## 2022-02-07 VITALS
DIASTOLIC BLOOD PRESSURE: 89 MMHG | HEART RATE: 91 BPM | TEMPERATURE: 98.6 F | WEIGHT: 197 LBS | SYSTOLIC BLOOD PRESSURE: 161 MMHG | RESPIRATION RATE: 15 BRPM | BODY MASS INDEX: 34.91 KG/M2 | HEIGHT: 63 IN | OXYGEN SATURATION: 94 %

## 2022-02-07 PROCEDURE — 99244 OFF/OP CNSLTJ NEW/EST MOD 40: CPT

## 2022-02-08 ENCOUNTER — RESULT REVIEW (OUTPATIENT)
Age: 62
End: 2022-02-08

## 2022-02-16 ENCOUNTER — NON-APPOINTMENT (OUTPATIENT)
Age: 62
End: 2022-02-16

## 2022-02-21 ENCOUNTER — APPOINTMENT (OUTPATIENT)
Dept: RADIOLOGY | Facility: CLINIC | Age: 62
End: 2022-02-21
Payer: COMMERCIAL

## 2022-02-21 ENCOUNTER — APPOINTMENT (OUTPATIENT)
Dept: ULTRASOUND IMAGING | Facility: CLINIC | Age: 62
End: 2022-02-21

## 2022-02-21 ENCOUNTER — RESULT REVIEW (OUTPATIENT)
Age: 62
End: 2022-02-21

## 2022-02-21 PROCEDURE — 72100 X-RAY EXAM L-S SPINE 2/3 VWS: CPT

## 2022-02-21 PROCEDURE — 76705 ECHO EXAM OF ABDOMEN: CPT

## 2022-03-16 ENCOUNTER — APPOINTMENT (OUTPATIENT)
Dept: INTERNAL MEDICINE | Facility: CLINIC | Age: 62
End: 2022-03-16
Payer: COMMERCIAL

## 2022-03-16 VITALS
WEIGHT: 192 LBS | HEART RATE: 83 BPM | OXYGEN SATURATION: 99 % | TEMPERATURE: 97.3 F | SYSTOLIC BLOOD PRESSURE: 122 MMHG | BODY MASS INDEX: 35.33 KG/M2 | DIASTOLIC BLOOD PRESSURE: 76 MMHG | HEIGHT: 62 IN

## 2022-03-16 DIAGNOSIS — S83.209A UNSPECIFIED TEAR OF UNSPECIFIED MENISCUS, CURRENT INJURY, UNSPECIFIED KNEE, INITIAL ENCOUNTER: ICD-10-CM

## 2022-03-16 DIAGNOSIS — L50.8 OTHER URTICARIA: ICD-10-CM

## 2022-03-16 PROCEDURE — 99396 PREV VISIT EST AGE 40-64: CPT | Mod: 25

## 2022-03-16 RX ORDER — FLUTICASONE FUROATE AND VILANTEROL TRIFENATATE 200; 25 UG/1; UG/1
200-25 POWDER RESPIRATORY (INHALATION)
Qty: 3 | Refills: 0 | Status: DISCONTINUED | COMMUNITY
Start: 2021-04-01 | End: 2022-03-16

## 2022-03-16 RX ORDER — FEXOFENADINE HCL 60 MG
CAPSULE ORAL
Refills: 0 | Status: ACTIVE | COMMUNITY

## 2022-03-16 RX ORDER — HYDROXYZINE HYDROCHLORIDE 25 MG/1
25 TABLET ORAL
Qty: 90 | Refills: 0 | Status: DISCONTINUED | COMMUNITY
Start: 2021-12-15 | End: 2022-03-16

## 2022-03-16 NOTE — ASSESSMENT
[FreeTextEntry1] : See health maintenance assessment and plan outlined below.  In addition:\par Full labs and urine testing done today\par Had bone density 2022\par Ortho f/u for knee issues= referrals given\par Had colonoscopy 2020 as per patient\par Saw GYN 2022\par Had mammograms 2022\par Continue medications, diet, exercises outlined\par EKG declined here today as just had with cardiology\par She will have all of her cardiology work-up records forwarded to me\par To see cardiology in f/u 2022\par Had CT chest 2021\par To monitor PFTs 2022\par Endocrine f/u 2022/does thyroid ultrasound with endocrine\par ENT follow-up 2022\par Allergy allergy follow-up 2022 for chronic urticaria\par Referred to sleep medicine for FLORIDALMA f/u\par Can see Neuro for migraines if needed\par To f/u with derm, ophtho, dentist 2022\par Consider psych follow-up for anxiety and depression 2022/on venlafaxine\par RTC for annual physical and as needed\par To call for any medical issues\par All of the above was discussed in detail with the patient and all of her questions were answered\par Written directions were given\par She verbally confirmed understanding of all of the above and agreement with the above plan\par

## 2022-03-16 NOTE — REVIEW OF SYSTEMS
[Vision Problems] : vision problems [Hearing Loss] : hearing loss [Dyspnea on Exertion] : dyspnea on exertion [Heartburn] : heartburn [Back Pain] : back pain [Headache] : headache [Negative] : Heme/Lymph [Joint Pain] : joint pain [Itching] : Itching [Skin Rash] : skin rash [Anxiety] : anxiety

## 2022-03-16 NOTE — PHYSICAL EXAM
[Well Nourished] : well nourished [Well Developed] : well developed [Well-Appearing] : well-appearing [Normal Voice/Communication] : normal voice/communication [Normal Sclera/Conjunctiva] : normal sclera/conjunctiva [PERRL] : pupils equal round and reactive to light [EOMI] : extraocular movements intact [Normal Outer Ear/Nose] : the outer ears and nose were normal in appearance [Normal TMs] : both tympanic membranes were normal [No JVD] : no jugular venous distention [No Lymphadenopathy] : no lymphadenopathy [Supple] : supple [No Respiratory Distress] : no respiratory distress  [No Accessory Muscle Use] : no accessory muscle use [Clear to Auscultation] : lungs were clear to auscultation bilaterally [Normal Rate] : normal rate  [Regular Rhythm] : with a regular rhythm [Normal S1, S2] : normal S1 and S2 [No Carotid Bruits] : no carotid bruits [Pedal Pulses Present] : the pedal pulses are present [No Edema] : there was no peripheral edema [No Extremity Clubbing/Cyanosis] : no extremity clubbing/cyanosis [Soft] : abdomen soft [Non Tender] : non-tender [Non-distended] : non-distended [No HSM] : no HSM [Normal Bowel Sounds] : normal bowel sounds [Declined Rectal Exam] : declined rectal exam [Normal Supraclavicular Nodes] : no supraclavicular lymphadenopathy [Normal Axillary Nodes] : no axillary lymphadenopathy [Normal Posterior Cervical Nodes] : no posterior cervical lymphadenopathy [Normal Anterior Cervical Nodes] : no anterior cervical lymphadenopathy [No CVA Tenderness] : no CVA  tenderness [No Spinal Tenderness] : no spinal tenderness [Grossly Normal Strength/Tone] : grossly normal strength/tone [No Rash] : no rash [Coordination Grossly Intact] : coordination grossly intact [No Focal Deficits] : no focal deficits [Normal Gait] : normal gait [Speech Grossly Normal] : speech grossly normal [Normal Affect] : the affect was normal [Alert and Oriented x3] : oriented to person, place, and time [No Acute Distress] : no acute distress [Normal Appearance] : normal in appearance [No Masses] : no palpable masses [No Nipple Discharge] : no nipple discharge [No Axillary Lymphadenopathy] : no axillary lymphadenopathy [de-identified] : No sinus tenderness; wearing full facemask [de-identified] : No stridor or thyromegaly [de-identified] : R=16; good air entry with no wheezing [de-identified] : 2/6 systolic murmur [de-identified] : Normal radial pulses; no cords [de-identified] : As per GYN

## 2022-03-16 NOTE — HEALTH RISK ASSESSMENT
[Yes] : Yes [Monthly or less (1 pt)] : Monthly or less (1 point) [1 or 2 (0 pts)] : 1 or 2 (0 points) [Never (0 pts)] : Never (0 points) [No] : In the past 12 months have you used drugs other than those required for medical reasons? No [No falls in past year] : Patient reported no falls in the past year [HIV Test offered] : HIV Test offered [Hepatitis C test offered] : Hepatitis C test offered [With Family] : lives with family [Employed] : employed [Graduate School] : graduate school [] :  [# Of Children ___] : has [unfilled] children [Feels Safe at Home] : Feels safe at home [Fully functional (bathing, dressing, toileting, transferring, walking, feeding)] : Fully functional (bathing, dressing, toileting, transferring, walking, feeding) [Fully functional (using the telephone, shopping, preparing meals, housekeeping, doing laundry, using] : Fully functional and needs no help or supervision to perform IADLs (using the telephone, shopping, preparing meals, housekeeping, doing laundry, using transportation, managing medications and managing finances) [Smoke Detector] : smoke detector [Carbon Monoxide Detector] : carbon monoxide detector [Seat Belt] :  uses seat belt [Sunscreen] : uses sunscreen [Very Good] : ~his/her~ current health as very good [Fair] :  ~his/her~ mood as fair [Intercurrent Urgi Care visits] : went to urgent care [Never] : Never [3] : 1) Little interest or pleasure doing things for nearly every day (3) [2] : 2) Feeling down, depressed, or hopeless for more than half of the days (2) [PHQ-2 Positive] : PHQ-2 Positive [PHQ-9 Positive] : PHQ-9 Positive [I have developed a follow-up plan documented below in the note.] : I have developed a follow-up plan documented below in the note. [None] : None [# of Members in Household ___] :  household currently consist of [unfilled] member(s) [Reports changes in vision] : Reports changes in vision [With Patient/Caregiver] : , with patient/caregiver [Designated Healthcare Proxy] : Designated healthcare proxy [Name: ___] : Health Care Proxy's Name: [unfilled]  [Relationship: ___] : Relationship: [unfilled] [de-identified] : endocrine, surgical oncology, dentist, ophtho, GYN, ortho, cardiology, allergist [Audit-CScore] : 1 [de-identified] : none [de-identified] : low carb/ low salt [[id number]] : 5 [Change in mental status noted] : No change in mental status noted [Reports changes in hearing] : Reports no changes in hearing [Reports changes in dental health] : Reports no changes in dental health [Guns at Home] : no guns at home [Travel to Developing Areas] : does not  travel to developing areas [TB Exposure] : is not being exposed to tuberculosis [Caregiver Concerns] : does not have caregiver concerns [MammogramDate] : 02/22 [PapSmearDate] : 03/22 [BoneDensityDate] : 03/22 [ColonoscopyDate] : 06/20 [FreeTextEntry2] :  [AdvancecareDate] : 03/22 [FreeTextEntry4] : [id number]

## 2022-03-18 ENCOUNTER — NON-APPOINTMENT (OUTPATIENT)
Age: 62
End: 2022-03-18

## 2022-03-21 LAB
25(OH)D3 SERPL-MCNC: 20.7 NG/ML
ALBUMIN SERPL ELPH-MCNC: 4.5 G/DL
ALP BLD-CCNC: 124 U/L
ALT SERPL-CCNC: 24 U/L
ANION GAP SERPL CALC-SCNC: 13 MMOL/L
APPEARANCE: ABNORMAL
AST SERPL-CCNC: 21 U/L
BACTERIA: NEGATIVE
BASOPHILS # BLD AUTO: 0.05 K/UL
BASOPHILS NFR BLD AUTO: 0.5 %
BILIRUB SERPL-MCNC: 0.6 MG/DL
BILIRUBIN URINE: NEGATIVE
BLOOD URINE: NEGATIVE
BUN SERPL-MCNC: 21 MG/DL
CALCIUM SERPL-MCNC: 9.5 MG/DL
CHLORIDE SERPL-SCNC: 104 MMOL/L
CHOLEST SERPL-MCNC: 162 MG/DL
CO2 SERPL-SCNC: 25 MMOL/L
COLOR: YELLOW
COVID-19 NUCLEOCAPSID  GAM ANTIBODY INTERPRETATION: POSITIVE
COVID-19 SPIKE DOMAIN ANTIBODY INTERPRETATION: POSITIVE
CREAT SERPL-MCNC: 0.69 MG/DL
EGFR: 99 ML/MIN/1.73M2
EOSINOPHIL # BLD AUTO: 0.15 K/UL
EOSINOPHIL NFR BLD AUTO: 1.4 %
ESTIMATED AVERAGE GLUCOSE: 120 MG/DL
FOLATE SERPL-MCNC: 16.2 NG/ML
GLUCOSE QUALITATIVE U: NEGATIVE
GLUCOSE SERPL-MCNC: 78 MG/DL
HBA1C MFR BLD HPLC: 5.8 %
HCT VFR BLD CALC: 46.7 %
HCV AB SER QL: NONREACTIVE
HCV S/CO RATIO: 0.14 S/CO
HDLC SERPL-MCNC: 50 MG/DL
HGB BLD-MCNC: 14.7 G/DL
HIV1+2 AB SPEC QL IA.RAPID: NONREACTIVE
HYALINE CASTS: 4 /LPF
IMM GRANULOCYTES NFR BLD AUTO: 0.3 %
IRON SERPL-MCNC: 78 UG/DL
KETONES URINE: NEGATIVE
LDLC SERPL CALC-MCNC: 99 MG/DL
LEUKOCYTE ESTERASE URINE: ABNORMAL
LYMPHOCYTES # BLD AUTO: 2.08 K/UL
LYMPHOCYTES NFR BLD AUTO: 19.3 %
MAN DIFF?: NORMAL
MCHC RBC-ENTMCNC: 29.9 PG
MCHC RBC-ENTMCNC: 31.5 GM/DL
MCV RBC AUTO: 94.9 FL
MICROSCOPIC-UA: NORMAL
MONOCYTES # BLD AUTO: 0.67 K/UL
MONOCYTES NFR BLD AUTO: 6.2 %
NEUTROPHILS # BLD AUTO: 7.81 K/UL
NEUTROPHILS NFR BLD AUTO: 72.3 %
NITRITE URINE: NEGATIVE
NONHDLC SERPL-MCNC: 112 MG/DL
PH URINE: 5.5
PLATELET # BLD AUTO: 323 K/UL
POTASSIUM SERPL-SCNC: 4.1 MMOL/L
PROT SERPL-MCNC: 7.1 G/DL
PROTEIN URINE: NORMAL
RBC # BLD: 4.92 M/UL
RBC # FLD: 12.9 %
RED BLOOD CELLS URINE: 2 /HPF
SARS-COV-2 AB SERPL IA-ACNC: >250 U/ML
SARS-COV-2 AB SERPL QL IA: 118 INDEX
SODIUM SERPL-SCNC: 142 MMOL/L
SPECIFIC GRAVITY URINE: 1.03
SQUAMOUS EPITHELIAL CELLS: 4 /HPF
TRIGL SERPL-MCNC: 65 MG/DL
TSH SERPL-ACNC: 0.59 UIU/ML
UROBILINOGEN URINE: NORMAL
VIT B12 SERPL-MCNC: 912 PG/ML
WBC # FLD AUTO: 10.79 K/UL
WHITE BLOOD CELLS URINE: 2 /HPF

## 2022-03-24 NOTE — ASSESSMENT
[FreeTextEntry1] : \par \par \par We had a discussion regarding the palpable finding in the right lower back I suggested imaging consisting of:\par Ultrasound of the region.\par X-rays of the back\par Prescriptions entered.\par \par I have asked her to call me 1 week after the imaging to discuss the results.\par \par The conversation will guide further management.\par \par \par Physician contacted through secure email.\par \par \par 2/21/2022.\par We spoke.\par February 11, 2022, she had screening bilateral mammograms and sonograms at : [facility]par Asymmetry in the outer left breast.\par Diagnostic left mammogram and sonogram recommended.\par My office will send her the prescription tomorrow.\par She will call to schedule the appointment.\par \par \par 2/28/2022.\par We spoke.\par February 21, 2022, she had the following imaging at our [facility].\par Lumbar x-ray:\par No bony abnormality corresponding to the soft tissue mass of the right back/flank.\par Sonogram of the left flank:\par 5.4 x 1.4 x 3.5 cm soft tissue mass, consistent with a lipoma.\par \par I explained that a diameter >5 cm, is the dimension of which we recommend elective excision.\par I indicated that short-term follow-up could be an option.\par The latter is her preference.\par Prescription entered for a repeat ultrasound at a 6-month interval, August 2022.\par \par \par \par 3/2/2022:\par Diagnostic left mammogram and breast ultrasound breast ultrasound at : [facility].\par "Normal left breast ultrasound.  Specifically in the area of mammographic concern in the 3:00 axis there are no sonographic abnormalities".\par 6-month follow-up mammogram recommended for September 2022.\par Prescription mailed 3/24/2022\par \par \par \par

## 2022-03-24 NOTE — PHYSICAL EXAM
[Normal] : supple, no neck mass and thyroid not enlarged [Normal] : no peripheral adenopathy appreciated [Normal Neck Lymph Nodes] : normal neck lymph nodes  [Normal Supraclavicular Lymph Nodes] : normal supraclavicular lymph nodes [Normal Groin Lymph Nodes] : normal groin lymph nodes [Normal Axillary Lymph Nodes] : normal axillary lymph nodes [de-identified] : Groins not examined [de-identified] : Below [de-identified] : Below

## 2022-03-24 NOTE — HISTORY OF PRESENT ILLNESS
[de-identified] : Relative of Prakash MOREAU\par \par \par 61-year-old lady referred by her internist (Dr. Lakshmi ARCOS) for the evaluation and management of a palpable finding: Lower RIGHT BACK.\par \par This is a slightly tender lump that she first noticed in 2020.\par It has gradually grown.\par \par Presently, no other lumps or bumps.\par No constitutional signs or symptoms.\par \par + Prior personal history:\par ~2010 I resected a soft tissue mass of the upper outer right arm, which was bothering her, and was benign.\par \par \par No personal history of malignancy.\par \par \par Family history of malignancy:\par Father: CRC (colorectal cancer).\par Maternal grandmother: Leukemia.\par \par \par PMD: Dr. Lakshmi ARCOS.\par \par No pacemaker or defibrillator.\par No anticoagulants.\par \par \par + Bronchitis/rhinitis.\par She uses a Breo inhaler, and albuterol, as needed.\par \par + Anxiety/stress.\par Treated with Effexor.\par Prescribed by her internist\par \par + History of thyroid nodule/multinodular goiter.\par Sees endocrinology: Dr. Woody SANTANA.\par She is also been seen by ENT: Dr. Mendoza LAMBERT.\par \par \par GYN: Shira HORTON NP at [facility].\par Fall 2020 visit was unremarkable.\par \par Last breast imaging was October 2020 at .\par I provided her with a prescription to have the study repeated presently\par \par \par Colonoscopy: June 2020.\par Dr. Canelo CHARLES: Okay x5 years.\par + FH: Father: CRC

## 2022-03-24 NOTE — REVIEW OF SYSTEMS
[Negative] : Heme/Lymph [FreeTextEntry4] : History of rhinitis [FreeTextEntry6] : History of bronchitis [FreeTextEntry8] : Family history of CRC [de-identified] : Right back mass [de-identified] : Multinodular goiter

## 2022-03-24 NOTE — REASON FOR VISIT
[Initial Consultation] : an initial consultation for [Other: _____] : [unfilled] [FreeTextEntry2] : Right lower back mass

## 2022-04-04 PROBLEM — Z00.00 ENCOUNTER FOR PREVENTIVE HEALTH EXAMINATION: Noted: 2022-04-04

## 2022-04-26 ENCOUNTER — APPOINTMENT (OUTPATIENT)
Dept: INTERNAL MEDICINE | Facility: CLINIC | Age: 62
End: 2022-04-26
Payer: COMMERCIAL

## 2022-04-26 VITALS
HEIGHT: 62 IN | BODY MASS INDEX: 35.33 KG/M2 | TEMPERATURE: 97.6 F | HEART RATE: 88 BPM | OXYGEN SATURATION: 97 % | DIASTOLIC BLOOD PRESSURE: 70 MMHG | WEIGHT: 192 LBS | SYSTOLIC BLOOD PRESSURE: 112 MMHG

## 2022-04-26 DIAGNOSIS — Z01.818 ENCOUNTER FOR OTHER PREPROCEDURAL EXAMINATION: ICD-10-CM

## 2022-04-26 DIAGNOSIS — Z87.828 PERSONAL HISTORY OF OTHER (HEALED) PHYSICAL INJURY AND TRAUMA: ICD-10-CM

## 2022-04-26 PROCEDURE — 99214 OFFICE O/P EST MOD 30 MIN: CPT | Mod: 25

## 2022-04-26 NOTE — REVIEW OF SYSTEMS
[Vision Problems] : vision problems [Hearing Loss] : hearing loss [Heartburn] : heartburn [Joint Pain] : joint pain [Back Pain] : back pain [Headache] : headache [Anxiety] : anxiety [Negative] : Heme/Lymph

## 2022-04-27 PROBLEM — Z87.828 HISTORY OF TORN MENISCUS OF RIGHT KNEE: Status: ACTIVE | Noted: 2022-04-27

## 2022-04-27 LAB
ANION GAP SERPL CALC-SCNC: 13 MMOL/L
BASOPHILS # BLD AUTO: 0.06 K/UL
BASOPHILS NFR BLD AUTO: 0.6 %
BUN SERPL-MCNC: 25 MG/DL
CALCIUM SERPL-MCNC: 10.1 MG/DL
CHLORIDE SERPL-SCNC: 102 MMOL/L
CO2 SERPL-SCNC: 25 MMOL/L
CREAT SERPL-MCNC: 0.77 MG/DL
EGFR: 88 ML/MIN/1.73M2
EOSINOPHIL # BLD AUTO: 0.13 K/UL
EOSINOPHIL NFR BLD AUTO: 1.2 %
GLUCOSE SERPL-MCNC: 135 MG/DL
HCT VFR BLD CALC: 46.7 %
HGB BLD-MCNC: 14.8 G/DL
IMM GRANULOCYTES NFR BLD AUTO: 0.3 %
LYMPHOCYTES # BLD AUTO: 2.26 K/UL
LYMPHOCYTES NFR BLD AUTO: 21.2 %
MAN DIFF?: NORMAL
MCHC RBC-ENTMCNC: 29.5 PG
MCHC RBC-ENTMCNC: 31.7 GM/DL
MCV RBC AUTO: 93 FL
MONOCYTES # BLD AUTO: 0.5 K/UL
MONOCYTES NFR BLD AUTO: 4.7 %
NEUTROPHILS # BLD AUTO: 7.7 K/UL
NEUTROPHILS NFR BLD AUTO: 72 %
PLATELET # BLD AUTO: 300 K/UL
POTASSIUM SERPL-SCNC: 4.3 MMOL/L
RBC # BLD: 5.02 M/UL
RBC # FLD: 13 %
SODIUM SERPL-SCNC: 141 MMOL/L
WBC # FLD AUTO: 10.68 K/UL

## 2022-04-27 NOTE — ASSESSMENT
[Patient Optimized for Surgery] : Patient optimized for surgery [ECG] : ECG [Cardiology consultation] : Cardiology consultation [Modify medications prior to procedure] : Modify medications prior to procedure [As per surgery] : as per surgery [FreeTextEntry4] : Medically cleared for knee surgery.\par Seeing cardiology on 4/27/22 and cardiology sending separate clearance with EKG. [FreeTextEntry7] : Hold diclofenac 1 week prior to OR

## 2022-04-27 NOTE — PHYSICAL EXAM
[No Acute Distress] : no acute distress [Well Nourished] : well nourished [Well Developed] : well developed [Well-Appearing] : well-appearing [Normal Voice/Communication] : normal voice/communication [Normal Sclera/Conjunctiva] : normal sclera/conjunctiva [PERRL] : pupils equal round and reactive to light [EOMI] : extraocular movements intact [Normal Outer Ear/Nose] : the outer ears and nose were normal in appearance [Normal TMs] : both tympanic membranes were normal [No JVD] : no jugular venous distention [Supple] : supple [No Respiratory Distress] : no respiratory distress  [No Accessory Muscle Use] : no accessory muscle use [Normal Rate] : normal rate  [Clear to Auscultation] : lungs were clear to auscultation bilaterally [Regular Rhythm] : with a regular rhythm [Normal S1, S2] : normal S1 and S2 [No Carotid Bruits] : no carotid bruits [No Edema] : there was no peripheral edema [No Extremity Clubbing/Cyanosis] : no extremity clubbing/cyanosis [Soft] : abdomen soft [Non-distended] : non-distended [No HSM] : no HSM [Normal Bowel Sounds] : normal bowel sounds [Declined Rectal Exam] : declined rectal exam [No CVA Tenderness] : no CVA  tenderness [No Spinal Tenderness] : no spinal tenderness [No Rash] : no rash [No Focal Deficits] : no focal deficits [Speech Grossly Normal] : speech grossly normal [Normal Affect] : the affect was normal [Alert and Oriented x3] : oriented to person, place, and time [Declined Breast Exam] : declined breast exam  [Non Tender] : non-tender [de-identified] : No sinus tenderness; wearing full facemask [de-identified] : No stridor [de-identified] : R=16; good air entry with no wheezing [de-identified] : 2/6 systolic murmur [de-identified] : Normal radial pulses; no cords [de-identified] : As per GYN

## 2022-04-27 NOTE — HISTORY OF PRESENT ILLNESS
[No Pertinent Cardiac History] : no history of aortic stenosis, atrial fibrillation, coronary artery disease, recent myocardial infarction, or implantable device/pacemaker [Sleep Apnea] : sleep apnea [No Adverse Anesthesia Reaction] : no adverse anesthesia reaction in self or family member [(Patient denies any chest pain, claudication, dyspnea on exertion, orthopnea, palpitations or syncope)] : Patient denies any chest pain, claudication, dyspnea on exertion, orthopnea, palpitations or syncope [Unable to assess] : unable to assess [Aortic Stenosis] : no aortic stenosis [Atrial Fibrillation] : no atrial fibrillation [Coronary Artery Disease] : no coronary artery disease [Implantable Device/Pacemaker] : no implantable device/pacemaker [Recent Myocardial Infarction] : no recent myocardial infarction [Asthma] : no asthma [COPD] : no COPD [Smoker] : not a smoker [Family Member] : no family member with adverse anesthesia reaction/sudden death [Self] : no previous adverse anesthesia reaction [Chronic Anticoagulation] : no chronic anticoagulation [Chronic Kidney Disease] : no chronic kidney disease [Diabetes] : no diabetes [NSAIDs: _____] : NSAIDs: [unfilled] [FreeTextEntry1] : repair of right knee torn meniscus [FreeTextEntry2] : 5/11/22 [FreeTextEntry3] : Dr. Valdez [FreeTextEntry4] : Here for medical clearance. [FreeTextEntry7] : EKG

## 2022-09-13 ENCOUNTER — RX RENEWAL (OUTPATIENT)
Age: 62
End: 2022-09-13

## 2022-10-04 ENCOUNTER — APPOINTMENT (OUTPATIENT)
Dept: ENDOCRINOLOGY | Facility: CLINIC | Age: 62
End: 2022-10-04

## 2022-10-04 VITALS
WEIGHT: 190 LBS | OXYGEN SATURATION: 96 % | SYSTOLIC BLOOD PRESSURE: 154 MMHG | BODY MASS INDEX: 34.96 KG/M2 | HEART RATE: 88 BPM | HEIGHT: 62 IN | DIASTOLIC BLOOD PRESSURE: 83 MMHG

## 2022-10-04 LAB — HBA1C MFR BLD HPLC: 5.7

## 2022-10-04 PROCEDURE — 99214 OFFICE O/P EST MOD 30 MIN: CPT | Mod: 25

## 2022-10-04 PROCEDURE — 36415 COLL VENOUS BLD VENIPUNCTURE: CPT

## 2022-10-04 PROCEDURE — 83036 HEMOGLOBIN GLYCOSYLATED A1C: CPT | Mod: QW

## 2022-10-04 NOTE — REVIEW OF SYSTEMS
[Fatigue] : fatigue [Recent Weight Loss (___ Lbs)] : recent weight loss: [unfilled] lbs [Joint Pain] : joint pain [Back Pain] : back pain [Anxiety] : anxiety [Stress] : stress [Hot Flashes] : hot flashes [Negative] : Gastrointestinal [Recent Weight Gain (___ Lbs)] : no recent weight gain [Dysphagia] : no dysphagia [Neck Pain] : no neck pain [Dysphonia] : no dysphonia [Polyuria] : no polyuria [Dry Skin] : no dry skin [Hair Loss] : no hair loss [Polydipsia] : no polydipsia [Swelling] : no swelling

## 2022-10-04 NOTE — HISTORY OF PRESENT ILLNESS
[FreeTextEntry1] : 61 y.o. female with h/o thyroid nodules diagnosed in 2004 presents for follow up visit. Was following with Dr. Jackman (ENT) in 2018 and reports had FNA in ?2015 which was benign. Thyroid ultrasound on October 30, 2020 showed enlarged gland with left lobe larger than right. There are multiple b/l nodules with largest on the right measuring 2.0 which is hypoechoic nodule with cystic component. The largest on the left is 5.1 cm isoechoic solid nodule with small cystic components. Nodules appear relatively stable compared to 2018. No dysphagia and no dysphonia. No head or neck RT exposure. No family history of thyroid disease. In regards to symptoms, patient reports fatigue, body pains, and weight gain. Reports h/o hives and did see allergy. \par \par On 1/20/21 had FNA of right lower pole 2.6 cm nodule which was consistent with nodular goiter and FNA of left midpole 4.9 cm nodule which was consistent with nodular goiter. \par \par CT scan of neck in January 2021 showed enlargement of the left thyroid lobe with associated nodules. There was mild rightward effect on the trachea without significant narrowing. \par \par Thyroid ultrasound on 2/11/22 showed right dominant nodule hypoechoic in the lower pole measuring 2.1 cm and left dominant 4.6 cm midpole isoechoic nodule appear stable. \par \par \par Does report h/o SOB with exertion. Saw cardiology at [facility]. Had normal stress echo. \par \par Also h/o prediabetes. No polyuria and no polydipsia. Now wearing glasses; otherwise vision is stable. Reports gradual weight gain over the years. Reports weight gain of 12 pounds during pandemic. No exercise. \par \par Had right meniscus repair in May 2022. Doing stretching. Doing PT.

## 2022-10-04 NOTE — ASSESSMENT
[FreeTextEntry1] : 61 y.o. female with h/o multinodular goiter, prediabetes and obesity.\par \par 1. MNG- Discussed pathophysiology. Patient is clinically euthyroid. Will check TFTs. Given the large size of the left dominant thyroid nodule, would consider surgery. Patient is not interested in surgery at this time. For now, will check thyroid ultrasound to evaluate nodules.\par \par 2. Prediabetes and Obesity- Hba1c is 5.7% today. Discussed increased risk of developing Type 2 DM. Encouraged a carbohydrate consistent diet with portion control and exercise.  \par \par Follow up in 6 months if stable\par Labs drawn in the office today

## 2022-10-04 NOTE — PHYSICAL EXAM
[Alert] : alert [No Acute Distress] : no acute distress [EOMI] : extra ocular movement intact [No LAD] : no lymphadenopathy [No Respiratory Distress] : no respiratory distress [Clear to Auscultation] : lungs were clear to auscultation bilaterally [Normal S1, S2] : normal S1 and S2 [Regular Rhythm] : with a regular rhythm [No Edema] : no peripheral edema [Normal Bowel Sounds] : normal bowel sounds [Not Tender] : non-tender [Not Distended] : not distended [Soft] : abdomen soft [Normal Anterior Cervical Nodes] : no anterior cervical lymphadenopathy [No Clubbing, Cyanosis] : no clubbing  or cyanosis of the fingernails [No Rash] : no rash [Normal Reflexes] : deep tendon reflexes were 2+ and symmetric [Normal Affect] : the affect was normal [Normal Mood] : the mood was normal [de-identified] : RAVEN

## 2022-10-05 LAB
T4 FREE SERPL-MCNC: 1.1 NG/DL
TSH SERPL-ACNC: 0.62 UIU/ML

## 2023-04-11 ENCOUNTER — RX RENEWAL (OUTPATIENT)
Age: 63
End: 2023-04-11

## 2023-05-17 ENCOUNTER — NON-APPOINTMENT (OUTPATIENT)
Age: 63
End: 2023-05-17

## 2023-05-17 ENCOUNTER — APPOINTMENT (OUTPATIENT)
Dept: INTERNAL MEDICINE | Facility: CLINIC | Age: 63
End: 2023-05-17
Payer: COMMERCIAL

## 2023-05-17 VITALS
WEIGHT: 197 LBS | SYSTOLIC BLOOD PRESSURE: 140 MMHG | BODY MASS INDEX: 35.35 KG/M2 | TEMPERATURE: 97.3 F | HEIGHT: 62.75 IN | HEART RATE: 96 BPM | OXYGEN SATURATION: 97 % | DIASTOLIC BLOOD PRESSURE: 76 MMHG

## 2023-05-17 DIAGNOSIS — Z00.00 ENCOUNTER FOR GENERAL ADULT MEDICAL EXAMINATION W/OUT ABNORMAL FINDINGS: ICD-10-CM

## 2023-05-17 PROCEDURE — 93000 ELECTROCARDIOGRAM COMPLETE: CPT

## 2023-05-17 PROCEDURE — 99396 PREV VISIT EST AGE 40-64: CPT | Mod: 25

## 2023-05-17 RX ORDER — DICLOFENAC SODIUM 75 MG/1
75 TABLET, DELAYED RELEASE ORAL
Qty: 28 | Refills: 0 | Status: DISCONTINUED | COMMUNITY
Start: 2021-12-09 | End: 2023-05-17

## 2023-05-17 NOTE — PHYSICAL EXAM
[No Acute Distress] : no acute distress [Well Nourished] : well nourished [Well Developed] : well developed [Well-Appearing] : well-appearing [Normal Voice/Communication] : normal voice/communication [Normal Sclera/Conjunctiva] : normal sclera/conjunctiva [PERRL] : pupils equal round and reactive to light [EOMI] : extraocular movements intact [Normal Outer Ear/Nose] : the outer ears and nose were normal in appearance [Normal TMs] : both tympanic membranes were normal [No JVD] : no jugular venous distention [No Lymphadenopathy] : no lymphadenopathy [Supple] : supple [No Respiratory Distress] : no respiratory distress  [No Accessory Muscle Use] : no accessory muscle use [Clear to Auscultation] : lungs were clear to auscultation bilaterally [Normal Rate] : normal rate  [Regular Rhythm] : with a regular rhythm [Normal S1, S2] : normal S1 and S2 [No Carotid Bruits] : no carotid bruits [Pedal Pulses Present] : the pedal pulses are present [No Edema] : there was no peripheral edema [No Extremity Clubbing/Cyanosis] : no extremity clubbing/cyanosis [Normal Appearance] : normal in appearance [No Nipple Discharge] : no nipple discharge [No Axillary Lymphadenopathy] : no axillary lymphadenopathy [Soft] : abdomen soft [Non Tender] : non-tender [Non-distended] : non-distended [No Masses] : no abdominal mass palpated [No HSM] : no HSM [Normal Bowel Sounds] : normal bowel sounds [Declined Rectal Exam] : declined rectal exam [No CVA Tenderness] : no CVA  tenderness [No Spinal Tenderness] : no spinal tenderness [Grossly Normal Strength/Tone] : grossly normal strength/tone [No Rash] : no rash [Coordination Grossly Intact] : coordination grossly intact [No Focal Deficits] : no focal deficits [Normal Gait] : normal gait [Speech Grossly Normal] : speech grossly normal [Normal Affect] : the affect was normal [Alert and Oriented x3] : oriented to person, place, and time [Normal Oropharynx] : the oropharynx was normal [Normal Supraclavicular Nodes] : no supraclavicular lymphadenopathy [Normal Axillary Nodes] : no axillary lymphadenopathy [Normal Posterior Cervical Nodes] : no posterior cervical lymphadenopathy [Normal Anterior Cervical Nodes] : no anterior cervical lymphadenopathy [de-identified] : Overweight [de-identified] : No sinus tenderness [de-identified] : No stridor or thyromegaly [de-identified] : R=16; good air entry with no wheezing [de-identified] : 2/6 systolic murmur [de-identified] : Normal radial pulses; no cords [de-identified] : As per GYN

## 2023-05-17 NOTE — ASSESSMENT
[FreeTextEntry1] : See health maintenance assessment and plan outlined below.  In addition:\par Full labs and urine testing done today\par Had bone density 2022\par Ortho f/u after right knee surgery///continue physical therapy 2023\par Strongly advised neurologic evaluation for probable neuropathy versus radiculopathy 2023\par Had colonoscopy 2020 = due for repeat procedure 2025 as per GI\par Saw GYN 2022 = advised GYN follow-up 2023\par She was advised to discuss management of her menopausal symptoms with GYN\par Had mammograms 2022 = prescription given to do mammograms as soon as possible 2023 as overdue\par Prescription given to do limited abdominal ultrasound to monitor lipoma as per Dr. Mark 2023///follow-up with Dr. Mark 2023\par Continue medications, diet, exercise as outlined\par See scanned EKG done here today = reviewed with patient\par She plans cardiology evaluation with echo 2023\par Had CT chest 2021\par To monitor PFTs 2023\par Endocrine f/u 2023///prescription given to do thyroid ultrasound 2023\par ENT follow-up 2023\par Allergy follow-up 2023 as needed\par Referred to sleep medicine for FLORIDALMA f/u 2023\par Can see Neuro for migraines if needed 2023\par To f/u with derm for full skin exam 2023\par Ophthalmology follow-up 2023 for complete eye exam\par Dental follow-up 2023\par She presently denies suicidal or homicidal ideation\par Continue venlafaxine for now///psychiatry evaluation advised for management of anxiety and depression\par She [facility] if trip to [facility] planned\par RTC for annual physical and as needed\par To call for any medical issues/pulmonary issues or if her status worsens and to return in follow-up sooner\par All of the above was discussed in detail with the patient and all of her questions were answered\par Written directions were given\par She verbally confirmed understanding of all of the above and agreement with the above plan\par

## 2023-05-17 NOTE — REVIEW OF SYSTEMS
[Vision Problems] : vision problems [Hearing Loss] : hearing loss [Heartburn] : heartburn [Joint Pain] : joint pain [Back Pain] : back pain [Skin Rash] : skin rash [Headache] : headache [Anxiety] : anxiety [Negative] : Heme/Lymph [Hot Flashes] : hot flashes [Recent Change In Weight] : ~T recent weight change [Insomnia] : insomnia [Depression] : depression

## 2023-05-17 NOTE — HEALTH RISK ASSESSMENT
[Fair] :  ~his/her~ mood as fair [Yes] : Yes [Monthly or less (1 pt)] : Monthly or less (1 point) [1 or 2 (0 pts)] : 1 or 2 (0 points) [Never (0 pts)] : Never (0 points) [No] : In the past 12 months have you used drugs other than those required for medical reasons? No [No falls in past year] : Patient reported no falls in the past year [I have developed a follow-up plan documented below in the note.] : I have developed a follow-up plan documented below in the note. [With Family] : lives with family [# of Members in Household ___] :  household currently consist of [unfilled] member(s) [Employed] : employed [Graduate School] : graduate school [] :  [# Of Children ___] : has [unfilled] children [Feels Safe at Home] : Feels safe at home [Fully functional (bathing, dressing, toileting, transferring, walking, feeding)] : Fully functional (bathing, dressing, toileting, transferring, walking, feeding) [Fully functional (using the telephone, shopping, preparing meals, housekeeping, doing laundry, using] : Fully functional and needs no help or supervision to perform IADLs (using the telephone, shopping, preparing meals, housekeeping, doing laundry, using transportation, managing medications and managing finances) [Smoke Detector] : smoke detector [Carbon Monoxide Detector] : carbon monoxide detector [Seat Belt] :  uses seat belt [Sunscreen] : uses sunscreen [With Patient/Caregiver] : , with patient/caregiver [Designated Healthcare Proxy] : Designated healthcare proxy [Name: ___] : Health Care Proxy's Name: [unfilled]  [Relationship: ___] : Relationship: [unfilled] [Good] : ~his/her~ current health as good [Intercurrent hospitalizations] : was admitted to the hospital  [0] : 1) Little interest or pleasure doing things: Not at all (0) [1] : 2) Feeling down, depressed, or hopeless for several days (1) [PHQ-2 Negative - No further assessment needed] : PHQ-2 Negative - No further assessment needed [Patient declined mammogram] : Patient declined mammogram [Patient declined PAP Smear] : Patient declined PAP Smear [HIV test declined] : HIV test declined [Hepatitis C test declined] : Hepatitis C test declined [On disability] : on disability [Never] : Never [FreeTextEntry1] : anxiety/depression, weight gain, knee pain, radiculopathy/neuropathy, dermatitis, insomnia [de-identified] : ortho, endocrine, dentist, ophtho,GYN, cardiology [Audit-CScore] : 1 [de-identified] : PT [de-identified] : regular [MNY9Miacs] : 1 [Change in mental status noted] : No change in mental status noted [Behavioral] : behavioral [Reports changes in hearing] : Reports no changes in hearing [Reports changes in vision] : Reports no changes in vision [Reports changes in dental health] : Reports no changes in dental health [Guns at Home] : no guns at home [Travel to Developing Areas] : does not  travel to developing areas [TB Exposure] : is not being exposed to tuberculosis [Caregiver Concerns] : does not have caregiver concerns [MammogramDate] : 02/22 [PapSmearDate] : 03/22 [BoneDensityDate] : 03/22 [ColonoscopyDate] : 06/20 [de-identified] : On extended leave after orthopedic surgery [FreeTextEntry2] :  [AdvancecareDate] : 05/23 [FreeTextEntry4] : [phone]

## 2023-05-17 NOTE — HISTORY OF PRESENT ILLNESS
[FreeTextEntry1] : Comes in for annual physical. [de-identified] : Had covid ?3/3020 and 12/2020.\par Had again 2022.

## 2023-05-22 LAB
25(OH)D3 SERPL-MCNC: 16.7 NG/ML
ALBUMIN SERPL ELPH-MCNC: 4 G/DL
ALP BLD-CCNC: 111 U/L
ALT SERPL-CCNC: 17 U/L
ANION GAP SERPL CALC-SCNC: 12 MMOL/L
APPEARANCE: CLEAR
AST SERPL-CCNC: 16 U/L
BACTERIA: ABNORMAL /HPF
BASOPHILS # BLD AUTO: 0.09 K/UL
BASOPHILS NFR BLD AUTO: 0.9 %
BILIRUB SERPL-MCNC: 0.3 MG/DL
BILIRUBIN URINE: NEGATIVE
BLOOD URINE: NEGATIVE
BUN SERPL-MCNC: 19 MG/DL
CALCIUM SERPL-MCNC: 9.4 MG/DL
CAST: 1 /LPF
CHLORIDE SERPL-SCNC: 110 MMOL/L
CHOLEST SERPL-MCNC: 195 MG/DL
CO2 SERPL-SCNC: 24 MMOL/L
COLOR: YELLOW
COVID-19 NUCLEOCAPSID  GAM ANTIBODY INTERPRETATION: POSITIVE
COVID-19 SPIKE DOMAIN ANTIBODY INTERPRETATION: POSITIVE
CREAT SERPL-MCNC: 0.84 MG/DL
EGFR: 79 ML/MIN/1.73M2
EOSINOPHIL # BLD AUTO: 0.19 K/UL
EOSINOPHIL NFR BLD AUTO: 1.9 %
EPITHELIAL CELLS: 4 /HPF
ESTIMATED AVERAGE GLUCOSE: 126 MG/DL
FOLATE SERPL-MCNC: 10.5 NG/ML
GLUCOSE QUALITATIVE U: NEGATIVE MG/DL
GLUCOSE SERPL-MCNC: 92 MG/DL
HBA1C MFR BLD HPLC: 6 %
HCT VFR BLD CALC: 43.7 %
HDLC SERPL-MCNC: 49 MG/DL
HGB BLD-MCNC: 14.2 G/DL
IMM GRANULOCYTES NFR BLD AUTO: 0.3 %
IRON SERPL-MCNC: 80 UG/DL
KETONES URINE: NEGATIVE MG/DL
LDLC SERPL CALC-MCNC: 116 MG/DL
LEUKOCYTE ESTERASE URINE: NEGATIVE
LYMPHOCYTES # BLD AUTO: 2.02 K/UL
LYMPHOCYTES NFR BLD AUTO: 20.4 %
MAN DIFF?: NORMAL
MCHC RBC-ENTMCNC: 30.2 PG
MCHC RBC-ENTMCNC: 32.5 GM/DL
MCV RBC AUTO: 93 FL
MEV IGG FLD QL IA: 10.6 AU/ML
MEV IGG+IGM SER-IMP: NEGATIVE
MICROSCOPIC-UA: NORMAL
MONOCYTES # BLD AUTO: 0.71 K/UL
MONOCYTES NFR BLD AUTO: 7.2 %
MUV AB SER-ACNC: POSITIVE
MUV IGG SER QL IA: 77.3 AU/ML
NEUTROPHILS # BLD AUTO: 6.86 K/UL
NEUTROPHILS NFR BLD AUTO: 69.3 %
NITRITE URINE: NEGATIVE
NONHDLC SERPL-MCNC: 146 MG/DL
PH URINE: 6
PLATELET # BLD AUTO: 280 K/UL
POTASSIUM SERPL-SCNC: 4.2 MMOL/L
PROT SERPL-MCNC: 6.5 G/DL
PROTEIN URINE: NEGATIVE MG/DL
RBC # BLD: 4.7 M/UL
RBC # FLD: 13.1 %
RED BLOOD CELLS URINE: 1 /HPF
RUBV IGG FLD-ACNC: 10.2 INDEX
RUBV IGG SER-IMP: POSITIVE
SARS-COV-2 AB SERPL IA-ACNC: >250 U/ML
SARS-COV-2 AB SERPL QL IA: 228 INDEX
SODIUM SERPL-SCNC: 146 MMOL/L
SPECIFIC GRAVITY URINE: 1.02
TRIGL SERPL-MCNC: 152 MG/DL
TSH SERPL-ACNC: 0.76 UIU/ML
UROBILINOGEN URINE: 0.2 MG/DL
VIT B12 SERPL-MCNC: 587 PG/ML
VZV AB TITR SER: POSITIVE
VZV IGG SER IF-ACNC: 830.6 INDEX
WBC # FLD AUTO: 9.9 K/UL
WHITE BLOOD CELLS URINE: 0 /HPF

## 2023-05-22 RX ORDER — ERGOCALCIFEROL 1.25 MG/1
1.25 MG CAPSULE, LIQUID FILLED ORAL
Qty: 13 | Refills: 1 | Status: ACTIVE | COMMUNITY
Start: 2023-05-22 | End: 1900-01-01

## 2023-11-14 ENCOUNTER — TRANSCRIPTION ENCOUNTER (OUTPATIENT)
Age: 63
End: 2023-11-14

## 2024-02-12 ENCOUNTER — RX RENEWAL (OUTPATIENT)
Age: 64
End: 2024-02-12

## 2024-05-12 PROBLEM — R22.2 MASS ON BACK: Status: ACTIVE | Noted: 2022-02-05

## 2024-05-13 ENCOUNTER — APPOINTMENT (OUTPATIENT)
Dept: SURGICAL ONCOLOGY | Facility: CLINIC | Age: 64
End: 2024-05-13
Payer: COMMERCIAL

## 2024-05-13 VITALS
WEIGHT: 202 LBS | HEIGHT: 62.5 IN | DIASTOLIC BLOOD PRESSURE: 87 MMHG | BODY MASS INDEX: 36.24 KG/M2 | RESPIRATION RATE: 16 BRPM | SYSTOLIC BLOOD PRESSURE: 137 MMHG | HEART RATE: 97 BPM | OXYGEN SATURATION: 97 %

## 2024-05-13 DIAGNOSIS — R22.2 LOCALIZED SWELLING, MASS AND LUMP, TRUNK: ICD-10-CM

## 2024-05-13 PROCEDURE — 99214 OFFICE O/P EST MOD 30 MIN: CPT

## 2024-05-15 ENCOUNTER — APPOINTMENT (OUTPATIENT)
Dept: ULTRASOUND IMAGING | Facility: CLINIC | Age: 64
End: 2024-05-15
Payer: COMMERCIAL

## 2024-05-15 PROCEDURE — 76604 US EXAM CHEST: CPT

## 2024-05-20 ENCOUNTER — RX RENEWAL (OUTPATIENT)
Age: 64
End: 2024-05-20

## 2024-05-20 ENCOUNTER — APPOINTMENT (OUTPATIENT)
Dept: NEUROLOGY | Facility: CLINIC | Age: 64
End: 2024-05-20
Payer: OTHER MISCELLANEOUS

## 2024-05-20 PROCEDURE — 95912 NRV CNDJ TEST 11-12 STUDIES: CPT

## 2024-05-20 PROCEDURE — 95886 MUSC TEST DONE W/N TEST COMP: CPT

## 2024-05-20 RX ORDER — VENLAFAXINE HYDROCHLORIDE 150 MG/1
150 CAPSULE, EXTENDED RELEASE ORAL
Qty: 90 | Refills: 0 | Status: ACTIVE | COMMUNITY
Start: 2021-04-01 | End: 1900-01-01

## 2024-06-11 ENCOUNTER — APPOINTMENT (OUTPATIENT)
Dept: ENDOCRINOLOGY | Facility: CLINIC | Age: 64
End: 2024-06-11
Payer: COMMERCIAL

## 2024-06-11 VITALS
BODY MASS INDEX: 36.72 KG/M2 | DIASTOLIC BLOOD PRESSURE: 70 MMHG | SYSTOLIC BLOOD PRESSURE: 132 MMHG | OXYGEN SATURATION: 96 % | HEART RATE: 89 BPM | WEIGHT: 204 LBS

## 2024-06-11 DIAGNOSIS — R73.03 PREDIABETES.: ICD-10-CM

## 2024-06-11 DIAGNOSIS — E04.2 NONTOXIC MULTINODULAR GOITER: ICD-10-CM

## 2024-06-11 DIAGNOSIS — E55.9 VITAMIN D DEFICIENCY, UNSPECIFIED: ICD-10-CM

## 2024-06-11 DIAGNOSIS — E66.9 OBESITY, UNSPECIFIED: ICD-10-CM

## 2024-06-11 PROCEDURE — 99214 OFFICE O/P EST MOD 30 MIN: CPT

## 2024-06-11 PROCEDURE — 36415 COLL VENOUS BLD VENIPUNCTURE: CPT

## 2024-06-11 NOTE — REVIEW OF SYSTEMS
[FreeTextEntry7] : Allergic to Ceclor [FreeTextEntry8] : Family history of CRC [de-identified] : Back mass [de-identified] : Anxiety [de-identified] : Goiter

## 2024-06-11 NOTE — HISTORY OF PRESENT ILLNESS
[FreeTextEntry1] : 63 y.o. female with h/o thyroid nodules diagnosed in 2004 presents for follow up visit.   Was following with Dr. Jackman (ENT) in 2018 and reports had FNA in ?2015 which was benign.   Thyroid ultrasound on October 30, 2020 showed enlarged gland with left lobe larger than right. There are multiple b/l nodules with largest on the right measuring 2.0 which is hypoechoic nodule with cystic component. The largest on the left is 5.1 cm isoechoic solid nodule with small cystic components. Nodules appear relatively stable compared to 2018.   No dysphagia and no dysphonia. No head or neck RT exposure. No family history of thyroid disease.   In regard to symptoms, patient reports fatigue, body pains, and weight gain. Reports h/o hives and did see allergy.   On 1/20/21 had FNA of right lower pole 2.6 cm nodule which was consistent with nodular goiter and FNA of left midpole 4.9 cm nodule which was consistent with nodular goiter.   CT scan of neck in January 2021 showed enlargement of the left thyroid lobe with associated nodules. There was mild rightward effect on the trachea without significant narrowing.   Thyroid ultrasound on 2/11/22 showed right dominant nodule hypoechoic in the lower pole measuring 2.1 cm and left dominant 4.6 cm midpole isoechoic nodule appear stable.   Thyroid ultrasound on 5/10/24 shows stable right upper pole nodule 0.4 cm (TR3), increase in right lower pole nodule to 2.8 cm (TR3), increase in right lower pole nodule to 0.9 cm (TR2), stable left upper pole nodule 0.7 cm (TR3) and increase in left midpole nodule to 4.9 cm (TR3).   Does report h/o SOB with exertion. Saw cardiology at [place]. Had normal stress echo.   Also h/o prediabetes. No polyuria and no polydipsia. Now wearing glasses; otherwise, vision is stable. Reports gradual weight gain over the years. Reports weight gain of 12 pounds during pandemic. No exercise.   Had right meniscus repair in May 2022. Completed PT.   Reports LE numbness and also pelvic pressure. Did see neurology with no clear diagnosis. Reports weight gain given less movement.

## 2024-06-11 NOTE — ASSESSMENT
[FreeTextEntry1] : 63-year-old lady.  Presents to discuss management of a soft tissue mass of the right back. Measured 5.7 cm on her last ultrasound examination in 2022 at our [place] location.  Prescription entered/provided to have the ultrasound repeated at [place].  I have asked her to call me 1 week after the imaging to discuss the results.  The conversation will guide further management.   5/15/2024: Sonogram of her chest/back at [facility]. No significant change from February 2022 in the subdermal, 4.5 x 1.4 x 3.4 cm encapsulated, isoechoic, avascular mass.   6/11/2024: We spoke on the phone. I reviewed the above sonogram results, which are not worrisome. She is reassured and will follow-up with me in a year, sooner if needed.

## 2024-06-11 NOTE — ASSESSMENT
[FreeTextEntry1] : 63 y.o. female with h/o multinodular goiter, prediabetes and obesity.  1. MNG- Discussed pathophysiology. Patient is clinically euthyroid. Will check TFTs. Prior FNAs were benign. Given the large size of the left dominant thyroid nodule and continued growth of nodules, would consider surgery. Patient is not interested in surgery at this time but will consider. For now, will continue to monitor and follow with serial thyroid ultrasound.  2. Prediabetes and Obesity- Will check Hba1c, CMP and lipids. Discussed increased risk of developing Type 2 DM. Encouraged a carbohydrate consistent diet with portion control and exercise.    3. Vitamin D def- Will check 25 vitamin D level and will adjust supplement if needed.   Follow up in 6 months if stable Labs drawn in the office today Follow up with neurology and PCP

## 2024-06-11 NOTE — PHYSICAL EXAM
[Alert] : alert [No Acute Distress] : no acute distress [EOMI] : extra ocular movement intact [No LAD] : no lymphadenopathy [No Respiratory Distress] : no respiratory distress [Clear to Auscultation] : lungs were clear to auscultation bilaterally [Normal S1, S2] : normal S1 and S2 [Regular Rhythm] : with a regular rhythm [No Edema] : no peripheral edema [Normal Bowel Sounds] : normal bowel sounds [Not Tender] : non-tender [Not Distended] : not distended [Soft] : abdomen soft [Normal Anterior Cervical Nodes] : no anterior cervical lymphadenopathy [No Clubbing, Cyanosis] : no clubbing  or cyanosis of the fingernails [No Rash] : no rash [Normal Reflexes] : deep tendon reflexes were 2+ and symmetric [Normal Affect] : the affect was normal [Normal Mood] : the mood was normal [de-identified] : RAVEN [de-identified] : 2/6 JESSIE

## 2024-06-11 NOTE — REASON FOR VISIT
[Follow-Up Visit] : a follow-up visit for [Other: _____] : [unfilled] [FreeTextEntry2] : Soft tissue neoplasm of the right mid/lower back.

## 2024-06-11 NOTE — HISTORY OF PRESENT ILLNESS
[de-identified] : Relative of Prakash MOREAU  63-year-old lady.  Last seen by me: February 7, 2022.  She was referred for the evaluation and management of a soft tissue mass of the right lower back. She first noticed the mass ~2020. It gradually grew, and became tender, prompting the referral by her internist, Dr. Lakshmi ARCOS.  My PE: ~4 x 2 cm smooth, oval, mobile, nontender, clinically subcutaneous mass located ~8 FB below the tip of the right scapula.  February 2022: Imaging at [place]: Sonogram: 5.4 x 1.4 x 3.5 cm soft tissue mass consistent with a lipoma. Lumbar x-ray: Graphic abnormality.  I suggested excision, given the size >5 cm. She chose expectant management.  She returns for interval follow-up today.   CC: She can still feel the lump. She does not think it has grown. It is still asymptomatic. No other specific or constitutional signs or symptoms.   + Prior personal history: ~2010 I resected a soft tissue mass of the upper outer right arm, which was bothering her, and was benign.   No personal history of malignancy.   Family history of malignancy: Father: CRC (colorectal cancer). Maternal grandmother: Leukemia.   PMD: Dr. Lakshmi ARCOS.  + ALLERGIC: Ceclor  No pacemaker or defibrillator. No anticoagulants.  + Bronchitis/rhinitis. She uses a Breo inhaler, and albuterol, as needed.  + Anxiety/stress. Treated with Effexor. Prescribed by her internist.  + History of thyroid nodule/multinodular goiter. Sees endocrinology: Dr. Wooyd SANTANA. She has also been seen by ENT: Dr. Mendoza LAMBERT.   GYN: Dr. Mindy KILPATRICK at [facility]. May 2024 visit was unremarkable.  Mammography May 2024 at , orchestrated by her gynecologist, was normal.   Colonoscopy: June 2020. Dr. Canelo CHARLES: Juve x5 years. + FH: Father: CRC.

## 2024-06-12 LAB
25(OH)D3 SERPL-MCNC: 22.2 NG/ML
ALBUMIN SERPL ELPH-MCNC: 4.2 G/DL
ALP BLD-CCNC: 128 U/L
ALT SERPL-CCNC: 24 U/L
ANION GAP SERPL CALC-SCNC: 12 MMOL/L
AST SERPL-CCNC: 19 U/L
BILIRUB SERPL-MCNC: 0.3 MG/DL
BUN SERPL-MCNC: 21 MG/DL
CALCIUM SERPL-MCNC: 9.1 MG/DL
CHLORIDE SERPL-SCNC: 105 MMOL/L
CHOLEST SERPL-MCNC: 193 MG/DL
CO2 SERPL-SCNC: 24 MMOL/L
CREAT SERPL-MCNC: 0.71 MG/DL
EGFR: 96 ML/MIN/1.73M2
ESTIMATED AVERAGE GLUCOSE: 126 MG/DL
GLUCOSE SERPL-MCNC: 99 MG/DL
HBA1C MFR BLD HPLC: 6 %
HCT VFR BLD CALC: 45.5 %
HDLC SERPL-MCNC: 53 MG/DL
HGB BLD-MCNC: 14.5 G/DL
LDLC SERPL CALC-MCNC: 123 MG/DL
MCHC RBC-ENTMCNC: 30.2 PG
MCHC RBC-ENTMCNC: 31.9 GM/DL
MCV RBC AUTO: 94.8 FL
NONHDLC SERPL-MCNC: 140 MG/DL
PLATELET # BLD AUTO: 296 K/UL
POTASSIUM SERPL-SCNC: 4.1 MMOL/L
PROT SERPL-MCNC: 6.9 G/DL
RBC # BLD: 4.8 M/UL
RBC # FLD: 13.8 %
SODIUM SERPL-SCNC: 141 MMOL/L
T4 FREE SERPL-MCNC: 1.2 NG/DL
TRIGL SERPL-MCNC: 91 MG/DL
TSH SERPL-ACNC: 0.78 UIU/ML
WBC # FLD AUTO: 7.58 K/UL

## 2024-07-11 ENCOUNTER — APPOINTMENT (OUTPATIENT)
Dept: INTERNAL MEDICINE | Facility: CLINIC | Age: 64
End: 2024-07-11
Payer: COMMERCIAL

## 2024-07-11 ENCOUNTER — NON-APPOINTMENT (OUTPATIENT)
Age: 64
End: 2024-07-11

## 2024-07-11 VITALS
SYSTOLIC BLOOD PRESSURE: 150 MMHG | WEIGHT: 196 LBS | HEART RATE: 86 BPM | BODY MASS INDEX: 36.07 KG/M2 | DIASTOLIC BLOOD PRESSURE: 88 MMHG | OXYGEN SATURATION: 96 % | TEMPERATURE: 98.7 F | HEIGHT: 62 IN

## 2024-07-11 DIAGNOSIS — Z87.898 PERSONAL HISTORY OF OTHER SPECIFIED CONDITIONS: ICD-10-CM

## 2024-07-11 DIAGNOSIS — Z01.818 ENCOUNTER FOR OTHER PREPROCEDURAL EXAMINATION: ICD-10-CM

## 2024-07-11 DIAGNOSIS — R06.09 OTHER FORMS OF DYSPNEA: ICD-10-CM

## 2024-07-11 DIAGNOSIS — U07.1 COVID-19: ICD-10-CM

## 2024-07-11 DIAGNOSIS — Z00.00 ENCOUNTER FOR GENERAL ADULT MEDICAL EXAMINATION W/OUT ABNORMAL FINDINGS: ICD-10-CM

## 2024-07-11 DIAGNOSIS — S83.209A UNSPECIFIED TEAR OF UNSPECIFIED MENISCUS, CURRENT INJURY, UNSPECIFIED KNEE, INITIAL ENCOUNTER: ICD-10-CM

## 2024-07-11 PROCEDURE — 93000 ELECTROCARDIOGRAM COMPLETE: CPT

## 2024-07-11 PROCEDURE — 99396 PREV VISIT EST AGE 40-64: CPT | Mod: 25

## 2024-07-11 RX ORDER — CHOLECALCIFEROL (VITAMIN D3) 25 MCG
TABLET ORAL
Refills: 0 | Status: ACTIVE | COMMUNITY

## 2024-07-11 RX ORDER — MELOXICAM 15 MG/1
TABLET ORAL
Refills: 0 | Status: ACTIVE | COMMUNITY

## 2024-07-11 RX ORDER — TELMISARTAN 40 MG/1
40 TABLET ORAL
Qty: 90 | Refills: 1 | Status: ACTIVE | COMMUNITY
Start: 2024-07-11 | End: 1900-01-01

## 2024-07-12 DIAGNOSIS — R73.03 PREDIABETES.: ICD-10-CM

## 2024-07-12 DIAGNOSIS — R82.90 UNSPECIFIED ABNORMAL FINDINGS IN URINE: ICD-10-CM

## 2024-07-12 DIAGNOSIS — E78.00 PURE HYPERCHOLESTEROLEMIA, UNSPECIFIED: ICD-10-CM

## 2024-07-12 LAB
25(OH)D3 SERPL-MCNC: 23.2 NG/ML
ALP BLD-CCNC: 131 U/L
ALT SERPL-CCNC: 36 U/L
ANION GAP SERPL CALC-SCNC: 15 MMOL/L
APPEARANCE: ABNORMAL
AST SERPL-CCNC: 30 U/L
BACTERIA: ABNORMAL /HPF
BASOPHILS # BLD AUTO: 0.06 K/UL
BASOPHILS NFR BLD AUTO: 0.7 %
BILIRUB SERPL-MCNC: 0.4 MG/DL
BILIRUBIN URINE: NEGATIVE
BLOOD URINE: NEGATIVE
BUN SERPL-MCNC: 14 MG/DL
CALCIUM SERPL-MCNC: 9.5 MG/DL
CAST: 5 /LPF
CHLORIDE SERPL-SCNC: 101 MMOL/L
CHOLEST SERPL-MCNC: 216 MG/DL
CO2 SERPL-SCNC: 24 MMOL/L
COLOR: NORMAL
COVID-19 NUCLEOCAPSID  GAM ANTIBODY INTERPRETATION: POSITIVE
COVID-19 SPIKE DOMAIN ANTIBODY INTERPRETATION: POSITIVE
CREAT SERPL-MCNC: 0.82 MG/DL
EGFR: 81 ML/MIN/1.73M2
EOSINOPHIL # BLD AUTO: 0.12 K/UL
EOSINOPHIL NFR BLD AUTO: 1.4 %
FOLATE SERPL-MCNC: 15 NG/ML
GLUCOSE SERPL-MCNC: 85 MG/DL
HBA1C MFR BLD HPLC: 6 %
HCT VFR BLD CALC: 48 %
HCV AB SER QL: NONREACTIVE
HCV S/CO RATIO: 0.22 S/CO
HDLC SERPL-MCNC: 51 MG/DL
HGB BLD-MCNC: 15.5 G/DL
HIV1+2 AB SPEC QL IA.RAPID: NONREACTIVE
HYALINE CASTS: PRESENT
IMM GRANULOCYTES NFR BLD AUTO: 0.2 %
IRON SERPL-MCNC: 105 UG/DL
KETONES URINE: ABNORMAL MG/DL
LDLC SERPL CALC-MCNC: 142 MG/DL
LEUKOCYTE ESTERASE URINE: ABNORMAL
LYMPHOCYTES # BLD AUTO: 1.52 K/UL
LYMPHOCYTES NFR BLD AUTO: 17.7 %
MAN DIFF?: NORMAL
MCHC RBC-ENTMCNC: 30.5 PG
MCHC RBC-ENTMCNC: 32.3 GM/DL
MCV RBC AUTO: 94.3 FL
MONOCYTES NFR BLD AUTO: 5.9 %
MUCUS: PRESENT
NEUTROPHILS # BLD AUTO: 6.35 K/UL
NEUTROPHILS NFR BLD AUTO: 74.1 %
NITRITE URINE: NEGATIVE
NONHDLC SERPL-MCNC: 165 MG/DL
PH URINE: 5.5
PLATELET # BLD AUTO: 281 K/UL
POTASSIUM SERPL-SCNC: 4.2 MMOL/L
PROT SERPL-MCNC: 7.3 G/DL
PROTEIN URINE: NORMAL MG/DL
RBC # BLD: 5.09 M/UL
RBC # FLD: 13.7 %
RED BLOOD CELLS URINE: 2 /HPF
REVIEW: NORMAL
SARS-COV-2 AB SERPL IA-ACNC: >250 U/ML
SARS-COV-2 AB SERPL QL IA: 200 INDEX
SPECIFIC GRAVITY URINE: 1.02
TRIGL SERPL-MCNC: 127 MG/DL
TSH SERPL-ACNC: 0.78 UIU/ML
UROBILINOGEN URINE: 0.2 MG/DL
VIT B12 SERPL-MCNC: 651 PG/ML
WBC # FLD AUTO: 8.58 K/UL
WHITE BLOOD CELLS URINE: 3 /HPF

## 2024-07-19 LAB — DNA PLOIDY SPEC FC-IMP: NORMAL

## 2024-09-09 ENCOUNTER — APPOINTMENT (OUTPATIENT)
Dept: INTERNAL MEDICINE | Facility: CLINIC | Age: 64
End: 2024-09-09
Payer: COMMERCIAL

## 2024-09-09 VITALS
WEIGHT: 201 LBS | DIASTOLIC BLOOD PRESSURE: 80 MMHG | BODY MASS INDEX: 36.06 KG/M2 | TEMPERATURE: 97.8 F | SYSTOLIC BLOOD PRESSURE: 142 MMHG | HEART RATE: 90 BPM | HEIGHT: 62.5 IN | OXYGEN SATURATION: 97 %

## 2024-09-09 DIAGNOSIS — I10 ESSENTIAL (PRIMARY) HYPERTENSION: ICD-10-CM

## 2024-09-09 DIAGNOSIS — R73.03 PREDIABETES.: ICD-10-CM

## 2024-09-09 DIAGNOSIS — E78.00 PURE HYPERCHOLESTEROLEMIA, UNSPECIFIED: ICD-10-CM

## 2024-09-09 DIAGNOSIS — R82.90 UNSPECIFIED ABNORMAL FINDINGS IN URINE: ICD-10-CM

## 2024-09-09 DIAGNOSIS — E55.9 VITAMIN D DEFICIENCY, UNSPECIFIED: ICD-10-CM

## 2024-09-09 DIAGNOSIS — Z23 ENCOUNTER FOR IMMUNIZATION: ICD-10-CM

## 2024-09-09 PROCEDURE — G2211 COMPLEX E/M VISIT ADD ON: CPT | Mod: NC

## 2024-09-09 PROCEDURE — 99214 OFFICE O/P EST MOD 30 MIN: CPT

## 2024-09-20 ENCOUNTER — RX RENEWAL (OUTPATIENT)
Age: 64
End: 2024-09-20

## 2024-11-21 ENCOUNTER — APPOINTMENT (OUTPATIENT)
Dept: SURGERY | Facility: CLINIC | Age: 64
End: 2024-11-21
Payer: COMMERCIAL

## 2024-11-21 VITALS
DIASTOLIC BLOOD PRESSURE: 73 MMHG | SYSTOLIC BLOOD PRESSURE: 122 MMHG | HEART RATE: 87 BPM | WEIGHT: 200 LBS | BODY MASS INDEX: 35.44 KG/M2 | HEIGHT: 63 IN

## 2024-11-21 DIAGNOSIS — E04.2 NONTOXIC MULTINODULAR GOITER: ICD-10-CM

## 2024-11-21 PROCEDURE — 99204 OFFICE O/P NEW MOD 45 MIN: CPT

## 2024-12-11 ENCOUNTER — APPOINTMENT (OUTPATIENT)
Dept: ENDOCRINOLOGY | Facility: CLINIC | Age: 64
End: 2024-12-11
Payer: COMMERCIAL

## 2024-12-11 DIAGNOSIS — E04.2 NONTOXIC MULTINODULAR GOITER: ICD-10-CM

## 2024-12-11 DIAGNOSIS — R73.03 PREDIABETES.: ICD-10-CM

## 2024-12-11 DIAGNOSIS — E55.9 VITAMIN D DEFICIENCY, UNSPECIFIED: ICD-10-CM

## 2024-12-11 DIAGNOSIS — E66.9 OBESITY, UNSPECIFIED: ICD-10-CM

## 2024-12-11 PROCEDURE — 99214 OFFICE O/P EST MOD 30 MIN: CPT

## 2025-01-14 ENCOUNTER — RX RENEWAL (OUTPATIENT)
Age: 65
End: 2025-01-14

## 2025-01-21 ENCOUNTER — RX RENEWAL (OUTPATIENT)
Age: 65
End: 2025-01-21

## 2025-02-13 ENCOUNTER — APPOINTMENT (OUTPATIENT)
Dept: INTERNAL MEDICINE | Facility: CLINIC | Age: 65
End: 2025-02-13
Payer: COMMERCIAL

## 2025-02-13 DIAGNOSIS — M85.80 OTHER SPECIFIED DISORDERS OF BONE DENSITY AND STRUCTURE, UNSPECIFIED SITE: ICD-10-CM

## 2025-02-13 DIAGNOSIS — M85.859 OTHER SPECIFIED DISORDERS OF BONE DENSITY AND STRUCTURE, UNSPECIFIED THIGH: ICD-10-CM

## 2025-02-13 DIAGNOSIS — E66.9 OBESITY, UNSPECIFIED: ICD-10-CM

## 2025-02-13 PROCEDURE — 99213 OFFICE O/P EST LOW 20 MIN: CPT | Mod: 93

## 2025-02-13 PROCEDURE — G2211 COMPLEX E/M VISIT ADD ON: CPT | Mod: NC,93

## 2025-02-24 ENCOUNTER — APPOINTMENT (OUTPATIENT)
Dept: ENDOCRINOLOGY | Facility: CLINIC | Age: 65
End: 2025-02-24
Payer: MEDICARE

## 2025-02-24 VITALS
HEIGHT: 63 IN | HEART RATE: 93 BPM | BODY MASS INDEX: 35.44 KG/M2 | WEIGHT: 200 LBS | SYSTOLIC BLOOD PRESSURE: 130 MMHG | OXYGEN SATURATION: 95 % | DIASTOLIC BLOOD PRESSURE: 80 MMHG

## 2025-02-24 DIAGNOSIS — E55.9 VITAMIN D DEFICIENCY, UNSPECIFIED: ICD-10-CM

## 2025-02-24 PROCEDURE — 99205 OFFICE O/P NEW HI 60 MIN: CPT

## 2025-02-27 ENCOUNTER — APPOINTMENT (OUTPATIENT)
Dept: INTERNAL MEDICINE | Facility: CLINIC | Age: 65
End: 2025-02-27
Payer: MEDICARE

## 2025-02-27 DIAGNOSIS — G47.33 OBSTRUCTIVE SLEEP APNEA (ADULT) (PEDIATRIC): ICD-10-CM

## 2025-02-27 PROCEDURE — 99203 OFFICE O/P NEW LOW 30 MIN: CPT | Mod: 2W

## 2025-03-04 ENCOUNTER — APPOINTMENT (OUTPATIENT)
Dept: ENDOCRINOLOGY | Facility: CLINIC | Age: 65
End: 2025-03-04
Payer: MEDICARE

## 2025-03-04 DIAGNOSIS — E66.9 OBESITY, UNSPECIFIED: ICD-10-CM

## 2025-03-04 DIAGNOSIS — M85.80 OTHER SPECIFIED DISORDERS OF BONE DENSITY AND STRUCTURE, UNSPECIFIED SITE: ICD-10-CM

## 2025-03-04 DIAGNOSIS — R73.03 PREDIABETES.: ICD-10-CM

## 2025-03-04 DIAGNOSIS — E04.2 NONTOXIC MULTINODULAR GOITER: ICD-10-CM

## 2025-03-04 PROCEDURE — 99204 OFFICE O/P NEW MOD 45 MIN: CPT | Mod: 2W

## 2025-03-05 RX ORDER — TIRZEPATIDE 2.5 MG/.5ML
2.5 INJECTION, SOLUTION SUBCUTANEOUS
Qty: 1 | Refills: 0 | Status: ACTIVE | COMMUNITY
Start: 2025-02-27

## 2025-04-18 ENCOUNTER — RX RENEWAL (OUTPATIENT)
Age: 65
End: 2025-04-18

## 2025-07-15 ENCOUNTER — APPOINTMENT (OUTPATIENT)
Dept: INTERNAL MEDICINE | Facility: CLINIC | Age: 65
End: 2025-07-15
Payer: COMMERCIAL

## 2025-07-15 VITALS
HEIGHT: 62 IN | HEART RATE: 80 BPM | SYSTOLIC BLOOD PRESSURE: 112 MMHG | DIASTOLIC BLOOD PRESSURE: 70 MMHG | TEMPERATURE: 98.3 F | BODY MASS INDEX: 33.86 KG/M2 | WEIGHT: 184 LBS | OXYGEN SATURATION: 99 %

## 2025-07-15 PROBLEM — Z63.79 STRESSFUL LIFE EVENTS AFFECTING FAMILY AND HOUSEHOLD: Status: RESOLVED | Noted: 2021-02-24 | Resolved: 2025-07-15

## 2025-07-15 PROBLEM — R82.90 ABNORMAL URINALYSIS: Status: RESOLVED | Noted: 2024-07-12 | Resolved: 2025-07-15

## 2025-07-15 PROBLEM — Z87.2 HISTORY OF CHRONIC URTICARIA: Status: RESOLVED | Noted: 2022-03-16 | Resolved: 2025-07-15

## 2025-07-15 PROBLEM — F43.9 STRESS SYNDROME: Status: RESOLVED | Noted: 2021-02-24 | Resolved: 2025-07-15

## 2025-07-15 PROBLEM — R79.89 ELEVATED LIVER FUNCTION TESTS: Status: RESOLVED | Noted: 2020-09-15 | Resolved: 2025-07-15

## 2025-07-15 PROBLEM — R22.2 PALPABLE MASS OF LOWER BACK: Status: RESOLVED | Noted: 2021-02-25 | Resolved: 2025-07-15

## 2025-07-15 PROBLEM — M54.50 CHRONIC LOWER BACK PAIN: Status: RESOLVED | Noted: 2020-09-15 | Resolved: 2025-07-15

## 2025-07-15 PROCEDURE — 36415 COLL VENOUS BLD VENIPUNCTURE: CPT

## 2025-07-15 PROCEDURE — 99396 PREV VISIT EST AGE 40-64: CPT

## 2025-07-15 RX ORDER — OMEGA-3/DHA/EPA/FISH OIL 300-1000MG
CAPSULE ORAL
Refills: 0 | Status: ACTIVE | COMMUNITY

## 2025-07-15 RX ORDER — TIZANIDINE 4 MG/1
TABLET ORAL
Refills: 0 | Status: ACTIVE | COMMUNITY

## 2025-07-15 RX ORDER — PREGABALIN 300 MG/1
CAPSULE ORAL
Refills: 0 | Status: ACTIVE | COMMUNITY

## 2025-07-15 RX ORDER — MULTIVITAMIN
TABLET ORAL
Refills: 0 | Status: ACTIVE | COMMUNITY

## 2025-07-18 LAB
25(OH)D3 SERPL-MCNC: 27.4 NG/ML
ALBUMIN SERPL ELPH-MCNC: 4.1 G/DL
ALP BLD-CCNC: 202 U/L
ALT SERPL-CCNC: 14 U/L
ANION GAP SERPL CALC-SCNC: 15 MMOL/L
APPEARANCE: CLEAR
AST SERPL-CCNC: 19 U/L
BACTERIA: NEGATIVE /HPF
BASOPHILS # BLD AUTO: 0.04 K/UL
BASOPHILS NFR BLD AUTO: 0.4 %
BILIRUB SERPL-MCNC: 0.2 MG/DL
BILIRUBIN URINE: NEGATIVE
BLOOD URINE: NEGATIVE
BUN SERPL-MCNC: 20 MG/DL
CALCIUM OXALATE CRYSTALS: PRESENT
CALCIUM SERPL-MCNC: 10.1 MG/DL
CAST: 3 /LPF
CHLORIDE SERPL-SCNC: 104 MMOL/L
CHOLEST SERPL-MCNC: 215 MG/DL
CO2 SERPL-SCNC: 22 MMOL/L
COLOR: YELLOW
CREAT SERPL-MCNC: 0.86 MG/DL
EGFRCR SERPLBLD CKD-EPI 2021: 76 ML/MIN/1.73M2
EOSINOPHIL # BLD AUTO: 0.14 K/UL
EOSINOPHIL NFR BLD AUTO: 1.4 %
EPITHELIAL CELLS: 4 /HPF
ESTIMATED AVERAGE GLUCOSE: 120 MG/DL
FOLATE SERPL-MCNC: 12.9 NG/ML
GLUCOSE QUALITATIVE U: NEGATIVE MG/DL
GLUCOSE SERPL-MCNC: 108 MG/DL
HBA1C MFR BLD HPLC: 5.8 %
HCT VFR BLD CALC: 42.1 %
HDLC SERPL-MCNC: 54 MG/DL
HGB BLD-MCNC: 12.9 G/DL
IMM GRANULOCYTES NFR BLD AUTO: 0.4 %
IRON SERPL-MCNC: 43 UG/DL
KETONES URINE: NEGATIVE MG/DL
LDLC SERPL-MCNC: 149 MG/DL
LEUKOCYTE ESTERASE URINE: ABNORMAL
LYMPHOCYTES # BLD AUTO: 1.75 K/UL
LYMPHOCYTES NFR BLD AUTO: 18.1 %
MAN DIFF?: NORMAL
MCHC RBC-ENTMCNC: 29.3 PG
MCHC RBC-ENTMCNC: 30.6 G/DL
MCV RBC AUTO: 95.5 FL
MICROSCOPIC-UA: NORMAL
MONOCYTES # BLD AUTO: 0.54 K/UL
MONOCYTES NFR BLD AUTO: 5.6 %
MUCUS: PRESENT
NEUTROPHILS # BLD AUTO: 7.17 K/UL
NEUTROPHILS NFR BLD AUTO: 74.1 %
NITRITE URINE: NEGATIVE
NONHDLC SERPL-MCNC: 161 MG/DL
PH URINE: 5.5
PLATELET # BLD AUTO: 341 K/UL
POTASSIUM SERPL-SCNC: 4.1 MMOL/L
PROT SERPL-MCNC: 6.8 G/DL
PROTEIN URINE: NEGATIVE MG/DL
RBC # BLD: 4.41 M/UL
RBC # FLD: 14 %
RED BLOOD CELLS URINE: 2 /HPF
REVIEW: NORMAL
SODIUM SERPL-SCNC: 142 MMOL/L
SPECIFIC GRAVITY URINE: 1.02
TRIGL SERPL-MCNC: 64 MG/DL
TSH SERPL-ACNC: 1.1 UIU/ML
UROBILINOGEN URINE: 0.2 MG/DL
VIT B12 SERPL-MCNC: 576 PG/ML
WBC # FLD AUTO: 9.68 K/UL
WHITE BLOOD CELLS URINE: 1 /HPF

## 2025-07-25 ENCOUNTER — RX RENEWAL (OUTPATIENT)
Age: 65
End: 2025-07-25
